# Patient Record
Sex: FEMALE | Race: WHITE | NOT HISPANIC OR LATINO | Employment: OTHER | URBAN - METROPOLITAN AREA
[De-identification: names, ages, dates, MRNs, and addresses within clinical notes are randomized per-mention and may not be internally consistent; named-entity substitution may affect disease eponyms.]

---

## 2020-09-28 ENCOUNTER — HOSPITAL ENCOUNTER (OUTPATIENT)
Dept: LAB | Facility: MEDICAL CENTER | Age: 70
End: 2020-09-28
Attending: PSYCHIATRY & NEUROLOGY
Payer: MEDICARE

## 2020-09-28 LAB
ALBUMIN SERPL BCP-MCNC: 4.6 G/DL (ref 3.2–4.9)
ALBUMIN/GLOB SERPL: 1.6 G/DL
ALP SERPL-CCNC: 33 U/L (ref 30–99)
ALT SERPL-CCNC: 16 U/L (ref 2–50)
ANION GAP SERPL CALC-SCNC: 15 MMOL/L (ref 7–16)
AST SERPL-CCNC: 27 U/L (ref 12–45)
BASOPHILS # BLD AUTO: 0.5 % (ref 0–1.8)
BASOPHILS # BLD: 0.04 K/UL (ref 0–0.12)
BILIRUB SERPL-MCNC: 0.3 MG/DL (ref 0.1–1.5)
BUN SERPL-MCNC: 25 MG/DL (ref 8–22)
CALCIUM SERPL-MCNC: 10.1 MG/DL (ref 8.5–10.5)
CHLORIDE SERPL-SCNC: 100 MMOL/L (ref 96–112)
CO2 SERPL-SCNC: 25 MMOL/L (ref 20–33)
CREAT SERPL-MCNC: 0.74 MG/DL (ref 0.5–1.4)
CRP SERPL HS-MCNC: 0.08 MG/DL (ref 0–0.75)
EOSINOPHIL # BLD AUTO: 0.06 K/UL (ref 0–0.51)
EOSINOPHIL NFR BLD: 0.7 % (ref 0–6.9)
ERYTHROCYTE [DISTWIDTH] IN BLOOD BY AUTOMATED COUNT: 50.8 FL (ref 35.9–50)
FOLATE SERPL-MCNC: 33.9 NG/ML
GLOBULIN SER CALC-MCNC: 2.8 G/DL (ref 1.9–3.5)
GLUCOSE SERPL-MCNC: 91 MG/DL (ref 65–99)
HCT VFR BLD AUTO: 46.5 % (ref 37–47)
HGB BLD-MCNC: 15.2 G/DL (ref 12–16)
IMM GRANULOCYTES # BLD AUTO: 0.02 K/UL (ref 0–0.11)
IMM GRANULOCYTES NFR BLD AUTO: 0.2 % (ref 0–0.9)
LYMPHOCYTES # BLD AUTO: 2.54 K/UL (ref 1–4.8)
LYMPHOCYTES NFR BLD: 31.4 % (ref 22–41)
MCH RBC QN AUTO: 30.7 PG (ref 27–33)
MCHC RBC AUTO-ENTMCNC: 32.7 G/DL (ref 33.6–35)
MCV RBC AUTO: 93.9 FL (ref 81.4–97.8)
MONOCYTES # BLD AUTO: 0.67 K/UL (ref 0–0.85)
MONOCYTES NFR BLD AUTO: 8.3 % (ref 0–13.4)
NEUTROPHILS # BLD AUTO: 4.76 K/UL (ref 2–7.15)
NEUTROPHILS NFR BLD: 58.9 % (ref 44–72)
NRBC # BLD AUTO: 0 K/UL
NRBC BLD-RTO: 0 /100 WBC
PLATELET # BLD AUTO: 268 K/UL (ref 164–446)
PMV BLD AUTO: 10.1 FL (ref 9–12.9)
POTASSIUM SERPL-SCNC: 4.5 MMOL/L (ref 3.6–5.5)
PROT SERPL-MCNC: 7.4 G/DL (ref 6–8.2)
RBC # BLD AUTO: 4.95 M/UL (ref 4.2–5.4)
SODIUM SERPL-SCNC: 140 MMOL/L (ref 135–145)
TREPONEMA PALLIDUM IGG+IGM AB [PRESENCE] IN SERUM OR PLASMA BY IMMUNOASSAY: NORMAL
TSH SERPL DL<=0.005 MIU/L-ACNC: 1.19 UIU/ML (ref 0.38–5.33)
VIT B12 SERPL-MCNC: 2689 PG/ML (ref 211–911)
WBC # BLD AUTO: 8.1 K/UL (ref 4.8–10.8)

## 2020-09-28 PROCEDURE — 85652 RBC SED RATE AUTOMATED: CPT

## 2020-09-28 PROCEDURE — 84445 ASSAY OF TSI GLOBULIN: CPT

## 2020-09-28 PROCEDURE — 86480 TB TEST CELL IMMUN MEASURE: CPT

## 2020-09-28 PROCEDURE — 82787 IGG 1 2 3 OR 4 EACH: CPT

## 2020-09-28 PROCEDURE — 84443 ASSAY THYROID STIM HORMONE: CPT | Mod: GA

## 2020-09-28 PROCEDURE — 85549 MURAMIDASE: CPT

## 2020-09-28 PROCEDURE — 85520 HEPARIN ASSAY: CPT

## 2020-09-28 PROCEDURE — 85730 THROMBOPLASTIN TIME PARTIAL: CPT | Mod: GA

## 2020-09-28 PROCEDURE — 86780 TREPONEMA PALLIDUM: CPT | Mod: GA

## 2020-09-28 PROCEDURE — 36415 COLL VENOUS BLD VENIPUNCTURE: CPT

## 2020-09-28 PROCEDURE — 82164 ANGIOTENSIN I ENZYME TEST: CPT

## 2020-09-28 PROCEDURE — 85613 RUSSELL VIPER VENOM DILUTED: CPT

## 2020-09-28 PROCEDURE — 84439 ASSAY OF FREE THYROXINE: CPT | Mod: GA

## 2020-09-28 PROCEDURE — 81241 F5 GENE: CPT

## 2020-09-28 PROCEDURE — 86140 C-REACTIVE PROTEIN: CPT

## 2020-09-28 PROCEDURE — 82746 ASSAY OF FOLIC ACID SERUM: CPT

## 2020-09-28 PROCEDURE — 86147 CARDIOLIPIN ANTIBODY EA IG: CPT

## 2020-09-28 PROCEDURE — 80053 COMPREHEN METABOLIC PANEL: CPT

## 2020-09-28 PROCEDURE — 85025 COMPLETE CBC W/AUTO DIFF WBC: CPT

## 2020-09-28 PROCEDURE — 86038 ANTINUCLEAR ANTIBODIES: CPT

## 2020-09-28 PROCEDURE — 86235 NUCLEAR ANTIGEN ANTIBODY: CPT

## 2020-09-28 PROCEDURE — 82607 VITAMIN B-12: CPT

## 2020-09-28 PROCEDURE — 85610 PROTHROMBIN TIME: CPT | Mod: GA

## 2020-09-29 LAB
APTT PPP: 27.2 SEC (ref 24.7–36)
ERYTHROCYTE [SEDIMENTATION RATE] IN BLOOD BY WESTERGREN METHOD: 4 MM/HOUR (ref 0–30)
INR PPP: 0.93 (ref 0.87–1.13)
LA PPP-IMP: NORMAL
PROTHROMBIN TIME: 12.7 SEC (ref 12–14.6)
SCREEN DRVVT: 36.3 SEC (ref 28–48)
UFH PPP CHRO-ACNC: <0.1 U/ML

## 2020-09-30 LAB
ACE SERPL-CCNC: 21 U/L (ref 9–67)
CARDIOLIPIN IGA SER IA-ACNC: 1 APL (ref 0–11)
CARDIOLIPIN IGG SER IA-ACNC: 4 GPL (ref 0–14)
CARDIOLIPIN IGM SER IA-ACNC: 5 MPL (ref 0–12)
GAMMA INTERFERON BACKGROUND BLD IA-ACNC: 0.02 IU/ML
IGG4 SER-MCNC: 27 MG/DL (ref 1–123)
M TB IFN-G BLD-IMP: NEGATIVE
M TB IFN-G CD4+ BCKGRND COR BLD-ACNC: 0 IU/ML
MITOGEN IGNF BCKGRD COR BLD-ACNC: >10 IU/ML
NUCLEAR IGG SER QL IA: NORMAL
QFT TB2 - NIL TBQ2: 0 IU/ML
SSA52 R0ENA AB IGG Q0420: 1 AU/ML (ref 0–40)
SSA60 R0ENA AB IGG Q0419: 41 AU/ML (ref 0–40)

## 2020-10-01 ENCOUNTER — HOSPITAL ENCOUNTER (OUTPATIENT)
Dept: RADIOLOGY | Facility: MEDICAL CENTER | Age: 70
End: 2020-10-01
Attending: PSYCHIATRY & NEUROLOGY
Payer: MEDICARE

## 2020-10-01 DIAGNOSIS — H57.12 ORBITAL PAIN, LEFT: ICD-10-CM

## 2020-10-01 DIAGNOSIS — H49.20 ABDUCENS NERVE PALSY, UNSPECIFIED LATERALITY: ICD-10-CM

## 2020-10-01 DIAGNOSIS — H57.12 PAIN IN LEFT EYE: ICD-10-CM

## 2020-10-01 DIAGNOSIS — H05.122 ORBITAL MYOSITIS OF LEFT SIDE: ICD-10-CM

## 2020-10-01 LAB — TSI SER-ACNC: <0.1 IU/L

## 2020-10-01 PROCEDURE — 70543 MRI ORBT/FAC/NCK W/O &W/DYE: CPT

## 2020-10-01 PROCEDURE — A9576 INJ PROHANCE MULTIPACK: HCPCS | Performed by: PSYCHIATRY & NEUROLOGY

## 2020-10-01 PROCEDURE — 70553 MRI BRAIN STEM W/O & W/DYE: CPT

## 2020-10-01 PROCEDURE — 700117 HCHG RX CONTRAST REV CODE 255: Performed by: PSYCHIATRY & NEUROLOGY

## 2020-10-01 RX ADMIN — GADOTERIDOL 20 ML: 279.3 INJECTION, SOLUTION INTRAVENOUS at 19:04

## 2020-10-03 LAB — T4 FREE SERPL DIALY-MCNC: 1.9 NG/DL (ref 1.1–2.4)

## 2020-10-04 LAB
F5 P.R506Q BLD/T QL: NEGATIVE
LYSOZYME SERPL-MCNC: 0.95 UG/ML

## 2020-11-10 ENCOUNTER — HOSPITAL ENCOUNTER (OUTPATIENT)
Dept: RADIOLOGY | Facility: MEDICAL CENTER | Age: 70
End: 2020-11-10
Attending: PSYCHIATRY & NEUROLOGY
Payer: MEDICARE

## 2020-11-10 DIAGNOSIS — I77.1 STRICTURE OF ARTERY (HCC): ICD-10-CM

## 2020-11-10 PROCEDURE — 70544 MR ANGIOGRAPHY HEAD W/O DYE: CPT

## 2020-11-10 PROCEDURE — A9576 INJ PROHANCE MULTIPACK: HCPCS | Performed by: PSYCHIATRY & NEUROLOGY

## 2020-11-10 PROCEDURE — 700117 HCHG RX CONTRAST REV CODE 255: Performed by: PSYCHIATRY & NEUROLOGY

## 2020-11-10 PROCEDURE — 70549 MR ANGIOGRAPH NECK W/O&W/DYE: CPT

## 2020-11-10 RX ADMIN — GADOTERIDOL 20 ML: 279.3 INJECTION, SOLUTION INTRAVENOUS at 15:53

## 2021-01-26 ENCOUNTER — APPOINTMENT (OUTPATIENT)
Dept: RADIOLOGY | Facility: MEDICAL CENTER | Age: 71
DRG: 093 | End: 2021-01-26
Attending: STUDENT IN AN ORGANIZED HEALTH CARE EDUCATION/TRAINING PROGRAM
Payer: MEDICARE

## 2021-01-26 ENCOUNTER — HOSPITAL ENCOUNTER (INPATIENT)
Facility: MEDICAL CENTER | Age: 71
LOS: 2 days | DRG: 093 | End: 2021-01-28
Attending: EMERGENCY MEDICINE | Admitting: INTERNAL MEDICINE
Payer: MEDICARE

## 2021-01-26 PROBLEM — I67.1 CEREBROVASCULAR DURAL AV FISTULA: Status: ACTIVE | Noted: 2021-01-26

## 2021-01-26 PROBLEM — E11.9 TYPE 2 DIABETES MELLITUS (HCC): Status: ACTIVE | Noted: 2021-01-26

## 2021-01-26 PROBLEM — I10 HYPERTENSION: Status: ACTIVE | Noted: 2021-01-26

## 2021-01-26 PROBLEM — E78.5 HYPERLIPIDEMIA: Status: ACTIVE | Noted: 2021-01-26

## 2021-01-26 PROBLEM — J45.909 ASTHMA: Status: ACTIVE | Noted: 2021-01-26

## 2021-01-26 LAB
ALBUMIN SERPL BCP-MCNC: 4.3 G/DL (ref 3.2–4.9)
ALBUMIN/GLOB SERPL: 1.4 G/DL
ALP SERPL-CCNC: 43 U/L (ref 30–99)
ALT SERPL-CCNC: 15 U/L (ref 2–50)
ANION GAP SERPL CALC-SCNC: 12 MMOL/L (ref 7–16)
AST SERPL-CCNC: 33 U/L (ref 12–45)
BASOPHILS # BLD AUTO: 1 % (ref 0–1.8)
BASOPHILS # BLD: 0.09 K/UL (ref 0–0.12)
BILIRUB SERPL-MCNC: 0.3 MG/DL (ref 0.1–1.5)
BUN SERPL-MCNC: 15 MG/DL (ref 8–22)
CALCIUM SERPL-MCNC: 10.6 MG/DL (ref 8.5–10.5)
CHLORIDE SERPL-SCNC: 102 MMOL/L (ref 96–112)
CO2 SERPL-SCNC: 26 MMOL/L (ref 20–33)
CREAT SERPL-MCNC: 0.85 MG/DL (ref 0.5–1.4)
CRP SERPL HS-MCNC: 0.34 MG/DL (ref 0–0.75)
D DIMER PPP IA.FEU-MCNC: 1.64 UG/ML (FEU) (ref 0–0.5)
EOSINOPHIL # BLD AUTO: 0.13 K/UL (ref 0–0.51)
EOSINOPHIL NFR BLD: 1.4 % (ref 0–6.9)
ERYTHROCYTE [DISTWIDTH] IN BLOOD BY AUTOMATED COUNT: 46.7 FL (ref 35.9–50)
ERYTHROCYTE [SEDIMENTATION RATE] IN BLOOD BY WESTERGREN METHOD: 11 MM/HOUR (ref 0–30)
GLOBULIN SER CALC-MCNC: 3.1 G/DL (ref 1.9–3.5)
GLUCOSE BLD-MCNC: 82 MG/DL (ref 65–99)
GLUCOSE SERPL-MCNC: 118 MG/DL (ref 65–99)
HCT VFR BLD AUTO: 46.7 % (ref 37–47)
HGB BLD-MCNC: 15.4 G/DL (ref 12–16)
IMM GRANULOCYTES # BLD AUTO: 0.03 K/UL (ref 0–0.11)
IMM GRANULOCYTES NFR BLD AUTO: 0.3 % (ref 0–0.9)
LYMPHOCYTES # BLD AUTO: 2.38 K/UL (ref 1–4.8)
LYMPHOCYTES NFR BLD: 25.5 % (ref 22–41)
MCH RBC QN AUTO: 32.2 PG (ref 27–33)
MCHC RBC AUTO-ENTMCNC: 33 G/DL (ref 33.6–35)
MCV RBC AUTO: 97.5 FL (ref 81.4–97.8)
MONOCYTES # BLD AUTO: 0.74 K/UL (ref 0–0.85)
MONOCYTES NFR BLD AUTO: 7.9 % (ref 0–13.4)
NEUTROPHILS # BLD AUTO: 5.97 K/UL (ref 2–7.15)
NEUTROPHILS NFR BLD: 63.9 % (ref 44–72)
NRBC # BLD AUTO: 0 K/UL
NRBC BLD-RTO: 0 /100 WBC
PLATELET # BLD AUTO: 218 K/UL (ref 164–446)
PMV BLD AUTO: 10.7 FL (ref 9–12.9)
POTASSIUM SERPL-SCNC: 4.5 MMOL/L (ref 3.6–5.5)
PROT SERPL-MCNC: 7.4 G/DL (ref 6–8.2)
RBC # BLD AUTO: 4.79 M/UL (ref 4.2–5.4)
SODIUM SERPL-SCNC: 140 MMOL/L (ref 135–145)
T4 FREE SERPL-MCNC: 0.67 NG/DL (ref 0.93–1.7)
TSH SERPL DL<=0.005 MIU/L-ACNC: 1.71 UIU/ML (ref 0.38–5.33)
WBC # BLD AUTO: 9.3 K/UL (ref 4.8–10.8)

## 2021-01-26 PROCEDURE — 84443 ASSAY THYROID STIM HORMONE: CPT

## 2021-01-26 PROCEDURE — 70544 MR ANGIOGRAPHY HEAD W/O DYE: CPT

## 2021-01-26 PROCEDURE — 85025 COMPLETE CBC W/AUTO DIFF WBC: CPT

## 2021-01-26 PROCEDURE — A9270 NON-COVERED ITEM OR SERVICE: HCPCS | Performed by: STUDENT IN AN ORGANIZED HEALTH CARE EDUCATION/TRAINING PROGRAM

## 2021-01-26 PROCEDURE — 80053 COMPREHEN METABOLIC PANEL: CPT

## 2021-01-26 PROCEDURE — 99223 1ST HOSP IP/OBS HIGH 75: CPT | Mod: AI,GC | Performed by: INTERNAL MEDICINE

## 2021-01-26 PROCEDURE — 70553 MRI BRAIN STEM W/O & W/DYE: CPT

## 2021-01-26 PROCEDURE — 85379 FIBRIN DEGRADATION QUANT: CPT

## 2021-01-26 PROCEDURE — 86038 ANTINUCLEAR ANTIBODIES: CPT

## 2021-01-26 PROCEDURE — 70549 MR ANGIOGRAPH NECK W/O&W/DYE: CPT

## 2021-01-26 PROCEDURE — 700102 HCHG RX REV CODE 250 W/ 637 OVERRIDE(OP): Performed by: STUDENT IN AN ORGANIZED HEALTH CARE EDUCATION/TRAINING PROGRAM

## 2021-01-26 PROCEDURE — 770006 HCHG ROOM/CARE - MED/SURG/GYN SEMI*

## 2021-01-26 PROCEDURE — 82962 GLUCOSE BLOOD TEST: CPT

## 2021-01-26 PROCEDURE — 700117 HCHG RX CONTRAST REV CODE 255: Performed by: STUDENT IN AN ORGANIZED HEALTH CARE EDUCATION/TRAINING PROGRAM

## 2021-01-26 PROCEDURE — 86140 C-REACTIVE PROTEIN: CPT

## 2021-01-26 PROCEDURE — 85652 RBC SED RATE AUTOMATED: CPT

## 2021-01-26 PROCEDURE — U0005 INFEC AGEN DETEC AMPLI PROBE: HCPCS

## 2021-01-26 PROCEDURE — 700111 HCHG RX REV CODE 636 W/ 250 OVERRIDE (IP): Performed by: STUDENT IN AN ORGANIZED HEALTH CARE EDUCATION/TRAINING PROGRAM

## 2021-01-26 PROCEDURE — 99285 EMERGENCY DEPT VISIT HI MDM: CPT

## 2021-01-26 PROCEDURE — 84439 ASSAY OF FREE THYROXINE: CPT

## 2021-01-26 PROCEDURE — U0003 INFECTIOUS AGENT DETECTION BY NUCLEIC ACID (DNA OR RNA); SEVERE ACUTE RESPIRATORY SYNDROME CORONAVIRUS 2 (SARS-COV-2) (CORONAVIRUS DISEASE [COVID-19]), AMPLIFIED PROBE TECHNIQUE, MAKING USE OF HIGH THROUGHPUT TECHNOLOGIES AS DESCRIBED BY CMS-2020-01-R: HCPCS

## 2021-01-26 PROCEDURE — A9576 INJ PROHANCE MULTIPACK: HCPCS | Performed by: STUDENT IN AN ORGANIZED HEALTH CARE EDUCATION/TRAINING PROGRAM

## 2021-01-26 RX ORDER — VITAMIN B COMPLEX
1000 TABLET ORAL 2 TIMES DAILY
COMMUNITY

## 2021-01-26 RX ORDER — M-VIT,TX,IRON,MINS/CALC/FOLIC 27MG-0.4MG
1 TABLET ORAL DAILY
Status: DISCONTINUED | OUTPATIENT
Start: 2021-01-27 | End: 2021-01-28 | Stop reason: HOSPADM

## 2021-01-26 RX ORDER — ATORVASTATIN CALCIUM 10 MG/1
10 TABLET, FILM COATED ORAL EVERY EVENING
COMMUNITY

## 2021-01-26 RX ORDER — ACETAMINOPHEN 325 MG/1
650 TABLET ORAL EVERY 6 HOURS PRN
Status: DISCONTINUED | OUTPATIENT
Start: 2021-01-26 | End: 2021-01-28 | Stop reason: HOSPADM

## 2021-01-26 RX ORDER — M-VIT,TX,IRON,MINS/CALC/FOLIC 27MG-0.4MG
0.5 TABLET ORAL 2 TIMES DAILY
COMMUNITY

## 2021-01-26 RX ORDER — POTASSIUM CHLORIDE 750 MG/1
10 TABLET, EXTENDED RELEASE ORAL 2 TIMES DAILY
Status: ON HOLD | COMMUNITY
End: 2021-01-28

## 2021-01-26 RX ORDER — CHOLECALCIFEROL (VITAMIN D3) 125 MCG
1000 CAPSULE ORAL DAILY
Status: DISCONTINUED | OUTPATIENT
Start: 2021-01-27 | End: 2021-01-28 | Stop reason: HOSPADM

## 2021-01-26 RX ORDER — LABETALOL HYDROCHLORIDE 5 MG/ML
10 INJECTION, SOLUTION INTRAVENOUS EVERY 4 HOURS PRN
Status: DISCONTINUED | OUTPATIENT
Start: 2021-01-26 | End: 2021-01-28 | Stop reason: HOSPADM

## 2021-01-26 RX ORDER — DEXTROSE MONOHYDRATE 25 G/50ML
50 INJECTION, SOLUTION INTRAVENOUS
Status: DISCONTINUED | OUTPATIENT
Start: 2021-01-26 | End: 2021-01-28 | Stop reason: HOSPADM

## 2021-01-26 RX ORDER — FUROSEMIDE 40 MG/1
40 TABLET ORAL DAILY
COMMUNITY

## 2021-01-26 RX ORDER — GAUZE BANDAGE 2" X 2"
100 BANDAGE TOPICAL DAILY
Status: DISCONTINUED | OUTPATIENT
Start: 2021-01-27 | End: 2021-01-28 | Stop reason: HOSPADM

## 2021-01-26 RX ORDER — VITAMIN B COMPLEX
1000 TABLET ORAL DAILY
Status: DISCONTINUED | OUTPATIENT
Start: 2021-01-27 | End: 2021-01-28 | Stop reason: HOSPADM

## 2021-01-26 RX ORDER — BISACODYL 10 MG
10 SUPPOSITORY, RECTAL RECTAL
Status: DISCONTINUED | OUTPATIENT
Start: 2021-01-26 | End: 2021-01-28 | Stop reason: HOSPADM

## 2021-01-26 RX ORDER — NAPROXEN SODIUM 220 MG
220 TABLET ORAL 2 TIMES DAILY PRN
Status: ON HOLD | COMMUNITY
End: 2021-01-28

## 2021-01-26 RX ORDER — LOSARTAN POTASSIUM 100 MG/1
100 TABLET ORAL DAILY
COMMUNITY

## 2021-01-26 RX ORDER — LOSARTAN POTASSIUM 50 MG/1
100 TABLET ORAL DAILY
Status: DISCONTINUED | OUTPATIENT
Start: 2021-01-27 | End: 2021-01-27

## 2021-01-26 RX ORDER — BIOTIN 5 MG
1000 TABLET ORAL DAILY
Status: DISCONTINUED | OUTPATIENT
Start: 2021-01-27 | End: 2021-01-26

## 2021-01-26 RX ORDER — INSULIN GLARGINE 300 U/ML
36 INJECTION, SOLUTION SUBCUTANEOUS
COMMUNITY

## 2021-01-26 RX ORDER — BIOTIN 5 MG
1000 TABLET ORAL DAILY
Status: ON HOLD | COMMUNITY
End: 2021-01-28

## 2021-01-26 RX ORDER — AMOXICILLIN 250 MG
2 CAPSULE ORAL 2 TIMES DAILY
Status: DISCONTINUED | OUTPATIENT
Start: 2021-01-26 | End: 2021-01-28 | Stop reason: HOSPADM

## 2021-01-26 RX ORDER — POLYETHYLENE GLYCOL 3350 17 G/17G
1 POWDER, FOR SOLUTION ORAL
Status: DISCONTINUED | OUTPATIENT
Start: 2021-01-26 | End: 2021-01-28 | Stop reason: HOSPADM

## 2021-01-26 RX ORDER — MONTELUKAST SODIUM 10 MG/1
10 TABLET ORAL
COMMUNITY

## 2021-01-26 RX ORDER — LANOLIN ALCOHOL/MO/W.PET/CERES
100 CREAM (GRAM) TOPICAL DAILY
COMMUNITY
End: 2021-09-13

## 2021-01-26 RX ORDER — MAGNESIUM OXIDE 400 MG/1
400 TABLET ORAL DAILY
Status: ON HOLD | COMMUNITY
End: 2021-01-28

## 2021-01-26 RX ORDER — ATORVASTATIN CALCIUM 10 MG/1
10 TABLET, FILM COATED ORAL EVERY EVENING
Status: DISCONTINUED | OUTPATIENT
Start: 2021-01-26 | End: 2021-01-28 | Stop reason: HOSPADM

## 2021-01-26 RX ORDER — MONTELUKAST SODIUM 10 MG/1
10 TABLET ORAL
Status: DISCONTINUED | OUTPATIENT
Start: 2021-01-26 | End: 2021-01-28 | Stop reason: HOSPADM

## 2021-01-26 RX ORDER — HEPARIN SODIUM 5000 [USP'U]/ML
5000 INJECTION, SOLUTION INTRAVENOUS; SUBCUTANEOUS EVERY 8 HOURS
Status: DISCONTINUED | OUTPATIENT
Start: 2021-01-26 | End: 2021-01-28 | Stop reason: HOSPADM

## 2021-01-26 RX ADMIN — HEPARIN SODIUM 5000 UNITS: 5000 INJECTION, SOLUTION INTRAVENOUS; SUBCUTANEOUS at 22:42

## 2021-01-26 RX ADMIN — ATORVASTATIN CALCIUM 10 MG: 10 TABLET, FILM COATED ORAL at 20:52

## 2021-01-26 RX ADMIN — MONTELUKAST 10 MG: 10 TABLET, FILM COATED ORAL at 22:42

## 2021-01-26 RX ADMIN — GADOTERIDOL 20 ML: 279.3 INJECTION, SOLUTION INTRAVENOUS at 22:02

## 2021-01-26 SDOH — HEALTH STABILITY: MENTAL HEALTH: HOW MANY STANDARD DRINKS CONTAINING ALCOHOL DO YOU HAVE ON A TYPICAL DAY?: 1 OR 2

## 2021-01-26 SDOH — HEALTH STABILITY: MENTAL HEALTH: HOW OFTEN DO YOU HAVE 6 OR MORE DRINKS ON ONE OCCASION?: NEVER

## 2021-01-26 SDOH — HEALTH STABILITY: MENTAL HEALTH: HOW OFTEN DO YOU HAVE A DRINK CONTAINING ALCOHOL?: MONTHLY OR LESS

## 2021-01-26 ASSESSMENT — LIFESTYLE VARIABLES
SUBSTANCE_ABUSE: 0
HAVE PEOPLE ANNOYED YOU BY CRITICIZING YOUR DRINKING: NO
TOTAL SCORE: 0
TOTAL SCORE: 0
EVER FELT BAD OR GUILTY ABOUT YOUR DRINKING: NO
EVER HAD A DRINK FIRST THING IN THE MORNING TO STEADY YOUR NERVES TO GET RID OF A HANGOVER: NO
DO YOU DRINK ALCOHOL: YES
TOTAL SCORE: 0
CONSUMPTION TOTAL: INCOMPLETE
HAVE YOU EVER FELT YOU SHOULD CUT DOWN ON YOUR DRINKING: NO

## 2021-01-26 ASSESSMENT — ENCOUNTER SYMPTOMS
SENSORY CHANGE: 0
FOCAL WEAKNESS: 0
ABDOMINAL PAIN: 0
NECK PAIN: 0
NAUSEA: 0
EYE PAIN: 1
VOMITING: 0
SHORTNESS OF BREATH: 0
CHILLS: 0
COUGH: 0
SORE THROAT: 0
WEAKNESS: 0
CONSTIPATION: 0
SPEECH CHANGE: 0
SINUS PAIN: 0
MYALGIAS: 0
DEPRESSION: 0
PALPITATIONS: 0
DIARRHEA: 0
DIZZINESS: 0
DOUBLE VISION: 0
BLURRED VISION: 1
FEVER: 0
BACK PAIN: 0
HEADACHES: 0
TINGLING: 1
WHEEZING: 0
WEIGHT LOSS: 0
BRUISES/BLEEDS EASILY: 0
PHOTOPHOBIA: 0
EYE DISCHARGE: 0

## 2021-01-26 ASSESSMENT — FIBROSIS 4 INDEX: FIB4 SCORE: 1.74

## 2021-01-26 ASSESSMENT — VISUAL ACUITY: OU: 1

## 2021-01-26 NOTE — ED TRIAGE NOTES
"Darnell Kirkland  70 y.o. female  Chief Complaint   Patient presents with   • Sent by MD     Patient was sent by her eye specialist recommending further tests including MRI of brain. Patient complaining of bilateral eye pressure and \"whooshing\" sound to bilateral ears that started 3 weeks ago.   "

## 2021-01-27 ENCOUNTER — APPOINTMENT (OUTPATIENT)
Dept: RADIOLOGY | Facility: MEDICAL CENTER | Age: 71
DRG: 093 | End: 2021-01-27
Attending: RADIOLOGY
Payer: MEDICARE

## 2021-01-27 LAB
ALBUMIN SERPL BCP-MCNC: 3.8 G/DL (ref 3.2–4.9)
ALBUMIN/GLOB SERPL: 1.6 G/DL
ALP SERPL-CCNC: 38 U/L (ref 30–99)
ALT SERPL-CCNC: 13 U/L (ref 2–50)
ANION GAP SERPL CALC-SCNC: 12 MMOL/L (ref 7–16)
AST SERPL-CCNC: 24 U/L (ref 12–45)
BASOPHILS # BLD AUTO: 0.9 % (ref 0–1.8)
BASOPHILS # BLD: 0.07 K/UL (ref 0–0.12)
BILIRUB SERPL-MCNC: 0.3 MG/DL (ref 0.1–1.5)
BUN SERPL-MCNC: 18 MG/DL (ref 8–22)
CALCIUM SERPL-MCNC: 9.4 MG/DL (ref 8.5–10.5)
CHLORIDE SERPL-SCNC: 101 MMOL/L (ref 96–112)
CO2 SERPL-SCNC: 26 MMOL/L (ref 20–33)
CREAT SERPL-MCNC: 0.79 MG/DL (ref 0.5–1.4)
EOSINOPHIL # BLD AUTO: 0.18 K/UL (ref 0–0.51)
EOSINOPHIL NFR BLD: 2.2 % (ref 0–6.9)
ERYTHROCYTE [DISTWIDTH] IN BLOOD BY AUTOMATED COUNT: 46.2 FL (ref 35.9–50)
GLOBULIN SER CALC-MCNC: 2.4 G/DL (ref 1.9–3.5)
GLUCOSE BLD-MCNC: 141 MG/DL (ref 65–99)
GLUCOSE BLD-MCNC: 145 MG/DL (ref 65–99)
GLUCOSE BLD-MCNC: 165 MG/DL (ref 65–99)
GLUCOSE BLD-MCNC: 176 MG/DL (ref 65–99)
GLUCOSE SERPL-MCNC: 115 MG/DL (ref 65–99)
HCT VFR BLD AUTO: 43.6 % (ref 37–47)
HGB BLD-MCNC: 14.2 G/DL (ref 12–16)
IMM GRANULOCYTES # BLD AUTO: 0.02 K/UL (ref 0–0.11)
IMM GRANULOCYTES NFR BLD AUTO: 0.2 % (ref 0–0.9)
INR PPP: 0.94 (ref 0.87–1.13)
LYMPHOCYTES # BLD AUTO: 2.49 K/UL (ref 1–4.8)
LYMPHOCYTES NFR BLD: 30.6 % (ref 22–41)
MAGNESIUM SERPL-MCNC: 2 MG/DL (ref 1.5–2.5)
MCH RBC QN AUTO: 31.7 PG (ref 27–33)
MCHC RBC AUTO-ENTMCNC: 32.6 G/DL (ref 33.6–35)
MCV RBC AUTO: 97.3 FL (ref 81.4–97.8)
MONOCYTES # BLD AUTO: 0.89 K/UL (ref 0–0.85)
MONOCYTES NFR BLD AUTO: 10.9 % (ref 0–13.4)
NEUTROPHILS # BLD AUTO: 4.5 K/UL (ref 2–7.15)
NEUTROPHILS NFR BLD: 55.2 % (ref 44–72)
NRBC # BLD AUTO: 0 K/UL
NRBC BLD-RTO: 0 /100 WBC
PLATELET # BLD AUTO: 213 K/UL (ref 164–446)
PMV BLD AUTO: 10.9 FL (ref 9–12.9)
POTASSIUM SERPL-SCNC: 4.2 MMOL/L (ref 3.6–5.5)
PROT SERPL-MCNC: 6.2 G/DL (ref 6–8.2)
PROTHROMBIN TIME: 12.9 SEC (ref 12–14.6)
RBC # BLD AUTO: 4.48 M/UL (ref 4.2–5.4)
SARS-COV-2 RNA RESP QL NAA+PROBE: NOTDETECTED
SODIUM SERPL-SCNC: 139 MMOL/L (ref 135–145)
SPECIMEN SOURCE: NORMAL
WBC # BLD AUTO: 8.2 K/UL (ref 4.8–10.8)

## 2021-01-27 PROCEDURE — 82962 GLUCOSE BLOOD TEST: CPT | Mod: 91

## 2021-01-27 PROCEDURE — 99153 MOD SED SAME PHYS/QHP EA: CPT

## 2021-01-27 PROCEDURE — 770006 HCHG ROOM/CARE - MED/SURG/GYN SEMI*

## 2021-01-27 PROCEDURE — A9270 NON-COVERED ITEM OR SERVICE: HCPCS | Performed by: STUDENT IN AN ORGANIZED HEALTH CARE EDUCATION/TRAINING PROGRAM

## 2021-01-27 PROCEDURE — B31CZZZ FLUOROSCOPY OF BILATERAL EXTERNAL CAROTID ARTERIES: ICD-10-PCS | Performed by: RADIOLOGY

## 2021-01-27 PROCEDURE — 700111 HCHG RX REV CODE 636 W/ 250 OVERRIDE (IP): Performed by: STUDENT IN AN ORGANIZED HEALTH CARE EDUCATION/TRAINING PROGRAM

## 2021-01-27 PROCEDURE — B31FZZZ FLUOROSCOPY OF LEFT VERTEBRAL ARTERY: ICD-10-PCS | Performed by: RADIOLOGY

## 2021-01-27 PROCEDURE — 36415 COLL VENOUS BLD VENIPUNCTURE: CPT

## 2021-01-27 PROCEDURE — 85610 PROTHROMBIN TIME: CPT

## 2021-01-27 PROCEDURE — 85025 COMPLETE CBC W/AUTO DIFF WBC: CPT

## 2021-01-27 PROCEDURE — 80053 COMPREHEN METABOLIC PANEL: CPT

## 2021-01-27 PROCEDURE — 99232 SBSQ HOSP IP/OBS MODERATE 35: CPT | Performed by: STUDENT IN AN ORGANIZED HEALTH CARE EDUCATION/TRAINING PROGRAM

## 2021-01-27 PROCEDURE — 700117 HCHG RX CONTRAST REV CODE 255: Performed by: RADIOLOGY

## 2021-01-27 PROCEDURE — B318ZZZ FLUOROSCOPY OF BILATERAL INTERNAL CAROTID ARTERIES: ICD-10-PCS | Performed by: RADIOLOGY

## 2021-01-27 PROCEDURE — 700102 HCHG RX REV CODE 250 W/ 637 OVERRIDE(OP): Performed by: STUDENT IN AN ORGANIZED HEALTH CARE EDUCATION/TRAINING PROGRAM

## 2021-01-27 PROCEDURE — B314ZZZ FLUOROSCOPY OF LEFT COMMON CAROTID ARTERY: ICD-10-PCS | Performed by: RADIOLOGY

## 2021-01-27 PROCEDURE — 700111 HCHG RX REV CODE 636 W/ 250 OVERRIDE (IP): Performed by: RADIOLOGY

## 2021-01-27 PROCEDURE — 700111 HCHG RX REV CODE 636 W/ 250 OVERRIDE (IP)

## 2021-01-27 PROCEDURE — 83735 ASSAY OF MAGNESIUM: CPT

## 2021-01-27 RX ORDER — LOSARTAN POTASSIUM 50 MG/1
50 TABLET ORAL DAILY
Status: DISCONTINUED | OUTPATIENT
Start: 2021-01-28 | End: 2021-01-28 | Stop reason: HOSPADM

## 2021-01-27 RX ORDER — ONDANSETRON 2 MG/ML
4 INJECTION INTRAMUSCULAR; INTRAVENOUS PRN
Status: ACTIVE | OUTPATIENT
Start: 2021-01-27 | End: 2021-01-27

## 2021-01-27 RX ORDER — SODIUM CHLORIDE 9 MG/ML
500 INJECTION, SOLUTION INTRAVENOUS
Status: ACTIVE | OUTPATIENT
Start: 2021-01-27 | End: 2021-01-27

## 2021-01-27 RX ORDER — MIDAZOLAM HYDROCHLORIDE 1 MG/ML
.5-2 INJECTION INTRAMUSCULAR; INTRAVENOUS PRN
Status: ACTIVE | OUTPATIENT
Start: 2021-01-27 | End: 2021-01-27

## 2021-01-27 RX ORDER — MIDAZOLAM HYDROCHLORIDE 1 MG/ML
INJECTION INTRAMUSCULAR; INTRAVENOUS
Status: COMPLETED
Start: 2021-01-27 | End: 2021-01-27

## 2021-01-27 RX ADMIN — LOSARTAN POTASSIUM 100 MG: 50 TABLET, FILM COATED ORAL at 05:39

## 2021-01-27 RX ADMIN — IOHEXOL 80 ML: 300 INJECTION, SOLUTION INTRAVENOUS at 16:00

## 2021-01-27 RX ADMIN — ACETAMINOPHEN 650 MG: 325 TABLET, FILM COATED ORAL at 07:49

## 2021-01-27 RX ADMIN — ACETAMINOPHEN 650 MG: 325 TABLET, FILM COATED ORAL at 18:40

## 2021-01-27 RX ADMIN — Medication 100 MG: at 05:38

## 2021-01-27 RX ADMIN — HEPARIN SODIUM 5000 UNITS: 5000 INJECTION, SOLUTION INTRAVENOUS; SUBCUTANEOUS at 05:39

## 2021-01-27 RX ADMIN — MULTIPLE VITAMINS W/ MINERALS TAB 1 TABLET: TAB at 05:39

## 2021-01-27 RX ADMIN — MIDAZOLAM HYDROCHLORIDE 1 MG: 1 INJECTION INTRAMUSCULAR; INTRAVENOUS at 14:38

## 2021-01-27 RX ADMIN — Medication 1000 UNITS: at 05:39

## 2021-01-27 RX ADMIN — FENTANYL CITRATE 25 MCG: 50 INJECTION, SOLUTION INTRAMUSCULAR; INTRAVENOUS at 14:38

## 2021-01-27 RX ADMIN — INSULIN LISPRO 7 UNITS: 100 INJECTION, SOLUTION INTRAVENOUS; SUBCUTANEOUS at 18:39

## 2021-01-27 RX ADMIN — FENTANYL CITRATE 25 MCG: 50 INJECTION, SOLUTION INTRAMUSCULAR; INTRAVENOUS at 14:42

## 2021-01-27 RX ADMIN — INSULIN LISPRO 7 UNITS: 100 INJECTION, SOLUTION INTRAVENOUS; SUBCUTANEOUS at 09:10

## 2021-01-27 RX ADMIN — HEPARIN SODIUM 5000 UNITS: 5000 INJECTION, SOLUTION INTRAVENOUS; SUBCUTANEOUS at 21:32

## 2021-01-27 RX ADMIN — MONTELUKAST 10 MG: 10 TABLET, FILM COATED ORAL at 21:32

## 2021-01-27 RX ADMIN — CYANOCOBALAMIN TAB 500 MCG 1000 MCG: 500 TAB at 05:39

## 2021-01-27 RX ADMIN — ASPIRIN 81 MG: 81 TABLET, COATED ORAL at 05:39

## 2021-01-27 RX ADMIN — MIDAZOLAM HYDROCHLORIDE 1 MG: 1 INJECTION, SOLUTION INTRAMUSCULAR; INTRAVENOUS at 14:38

## 2021-01-27 RX ADMIN — ATORVASTATIN CALCIUM 10 MG: 10 TABLET, FILM COATED ORAL at 18:40

## 2021-01-27 ASSESSMENT — ENCOUNTER SYMPTOMS
HEADACHES: 0
COUGH: 0
SENSORY CHANGE: 0
EYE REDNESS: 1
DOUBLE VISION: 0
SPUTUM PRODUCTION: 0
DIZZINESS: 0
DEPRESSION: 0
PHOTOPHOBIA: 0
BLURRED VISION: 0
SHORTNESS OF BREATH: 0
FEVER: 0
EYE PAIN: 0
NAUSEA: 0
SPEECH CHANGE: 0
VOMITING: 0

## 2021-01-27 ASSESSMENT — PAIN DESCRIPTION - PAIN TYPE: TYPE: ACUTE PAIN

## 2021-01-27 NOTE — PROGRESS NOTES
IR RN note    Patient underwent a Cerebral Angiogram by Dr. Burnett.  Procedure site was verified by MD using imaging guidance. Consent was signed.  BRIAN Crowe and Gsu assisted. Patient was placed in a Supine position.  Vitals were taken every 5 minutes and remained stable during procedure (see doc flow sheet for results).  CO2 waveform capnography was monitored throughout procedure, see chart.  Right groin access site.   An Angioseal, gauze and tegaderm dressing was placed over surgical site. Report called to Rita CHAMPION. Pt transported by lore with RN to S182-2, with monitor .   Reviewed sedation orders with MD Askew 6 Fr  REF # [id number]  LOT # [id number]  Exp. 9/30/21

## 2021-01-27 NOTE — ASSESSMENT & PLAN NOTE
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

## 2021-01-27 NOTE — H&P
"History & Physical Note    Date of Admission: 1/26/2021  Admission Status: Emergency  [facility] Team: UNSOM  Attending: Yoel Jackson M.D.   Night Senior Resident: Dr. Brito  Admitting Intern: Dr. Blue  Contact Number: [phone]    Chief Complaint: scleral injection, watery eyes, concern for dural AV fistula    History of Present Illness (HPI):     Darnell Kirkland is a 70 y.o. female with past medical history of type 2 diabetes, migraine, asthma, hypertension, and hyperlipidemia who presented to the ED on 1/26/2021 with 1 month history of increased eye pressure, and hearing \"swooshing\" sound in right ear.  Patient was seen in clinic with her neuro-ophthalmologist Dr. Hutchins who sent her to the ED for concern of dural based AV fistula given symptoms of increased bilateral eye pressure, sclera injection with corkscrew venous dilation, and intermittent HA that was previously treated with steroids per patient. Patient states that she has had increased eye pressure over the last month to recently started in the left eye and eventually also began in the right eye; patient denies any pain or vision changes with this increased pressure.  However, patient endorses that the eye pressure is worse in the morning when she first wakes up, but eventually subsides throughout the day-notes that the pressure gets so bad and she can barely see.  Patient also endorses whooshing sound in the right ear, pulsatile in nature that has also occurred over the past month.  She denies any recent headaches, nausea, vomiting, but endorses feeling unstable on her feet but denies any dizziness or lightheadedness.  She denies any bleeding from the ears eyes or lungs denies abdominal pain denies any bowel or bladder dysfunction denies any neuropathy or muscle weakness of face.    ED course: Patient is afebrile, tachycardic to 110, respiratory rate 18, blood pressure hypertensive to 154/86, saturating well on room air.  WBC 9.3, sodium 140, " potassium 4.5, BUN 15, creatinine 0.85, LCM 10.6, TSH 1.71, free T4 0.67, CRP 0.34.  MRI brain with and without, MRA head and neck, and MR venogram ordered and pending.    Review of Systems:   Review of Systems   Constitutional: Negative for chills, fever, malaise/fatigue and weight loss.   HENT: Negative for ear discharge, ear pain, hearing loss, nosebleeds, sinus pain, sore throat and tinnitus.    Eyes: Positive for blurred vision and pain. Negative for double vision, photophobia and discharge.        Feels bilateral eye pressure - not pain; blurred vision in AM when pressure is at worst, denies HA   Respiratory: Negative for cough, shortness of breath and wheezing.    Cardiovascular: Negative for chest pain, palpitations and leg swelling.   Gastrointestinal: Negative for abdominal pain, constipation, diarrhea, nausea and vomiting.   Genitourinary: Negative for frequency, hematuria and urgency.   Musculoskeletal: Negative for back pain, joint pain, myalgias and neck pain.   Skin: Negative for itching and rash.   Neurological: Positive for tingling. Negative for dizziness, sensory change, speech change, focal weakness, weakness and headaches.        Diabetic neuropathy of b/l LE   Endo/Heme/Allergies: Does not bruise/bleed easily.   Psychiatric/Behavioral: Negative for depression, substance abuse and suicidal ideas.       Past Medical History:   Past Medical History was reviewed with patient.   has a past medical history of Asthma, Diabetes (HCC), Diabetic neuropathy (HCC), Head ache, Hyperlipidemia, Hypertension, and Migraine.    Past Surgical History: Past Surgical History was reviewed with patient.   has no past surgical history on file.    Medications: Medications have been reviewed with patient.    No current facility-administered medications on file prior to encounter.      Current Outpatient Medications on File Prior to Encounter   Medication Sig Dispense Refill   • Insulin Glargine, 2 Unit Dial, (TOUJEO MAX  SOLOSTAR) 300 UNIT/ML Solution Pen-injector Inject 36 Units under the skin every bedtime.     • insulin aspart (NOVOLOG) 100 UNIT/ML Solution Inject 2-10 Units under the skin 3 times a day before meals. 151-200 = 2 units  201-250 = 4 units  251-300 = 6 units  301-350 = 8 units  351-400 = 10 units     • metformin (GLUCOPHAGE) 1000 MG tablet Take 1,000 mg by mouth 2 times a day, with meals.     • potassium chloride SA (K-DUR) 10 MEQ Tab CR Take 10 mEq by mouth 2 times a day.     • furosemide (LASIX) 40 MG Tab Take 20 mg by mouth every day.     • atorvastatin (LIPITOR) 10 MG Tab Take 10 mg by mouth every day.     • losartan (COZAAR) 100 MG Tab Take 100 mg by mouth every day.     • montelukast (SINGULAIR) 10 MG Tab Take 10 mg by mouth every bedtime.     • cyanocobalamin (VITAMIN B12) 1000 MCG Tab Take 1,000 mcg by mouth every day.     • thiamine (THIAMINE) 100 MG tablet Take 100 mg by mouth every day.     • magnesium oxide (MAG-OX) 400 MG Tab tablet Take 400 mg by mouth every day.     • Krill Oil 1000 MG Cap Take 1,000 mg by mouth every day.     • vitamin D (CHOLECALCIFEROL) 1000 Unit (25 mcg) Tab Take 1,000 Units by mouth every day.     • therapeutic multivitamin-minerals (THERAGRAN-M) Tab Take 1 Tab by mouth every day.     • aspirin EC (ECOTRIN) 81 MG Tablet Delayed Response Take 81 mg by mouth every day.     • naproxen (ALEVE) 220 MG tablet Take 220 mg by mouth 2 times a day as needed. Indications: Pain          Allergies: Allergies have been reviewed with patient.  Allergies   Allergen Reactions   • Clindamycin      Hives       Family History:   family history is not on file.     Social History:   Tobacco: Quit 10 yrs ago, 40 PY history   Alcohol: infrequent, roughly 1/month   Recreational drugs (illegal and prescription):  THC gummies for Headache 2-6 months ago   Employment: retired  Living situation:  Lives in own home with  and daughter  Recent travel:  Denies  Primary Care Provider: tash MONTES  JAMES Alexis      Physical Exam:     Vitals:  Temp:  [36.6 °C (97.8 °F)] 36.6 °C (97.8 °F)  Pulse:  [110] 110  Resp:  [18] 18  BP: (154)/(86) 154/86  SpO2:  [93 %] 93 %    Physical Exam  Constitutional:       General: She is not in acute distress.     Appearance: Normal appearance. She is not ill-appearing or toxic-appearing.   HENT:      Head: Normocephalic and atraumatic.      Right Ear: External ear normal.      Left Ear: External ear normal.      Nose: Nose normal.   Eyes:      General: Lids are normal. Vision grossly intact. Gaze aligned appropriately. No scleral icterus.     Extraocular Movements: Extraocular movements intact.      Conjunctiva/sclera:      Right eye: Right conjunctiva is injected.      Left eye: Left conjunctiva is injected.      Pupils: Pupils are equal, round, and reactive to light.      Comments: Corkscrew venous dilation of sclera with diffuse injection of both eyes   Neck:      Musculoskeletal: Normal range of motion and neck supple.   Cardiovascular:      Rate and Rhythm: Regular rhythm. Tachycardia present.      Pulses: Normal pulses.      Heart sounds: Normal heart sounds. No murmur. No gallop.    Pulmonary:      Effort: Pulmonary effort is normal.      Breath sounds: Normal breath sounds. No wheezing, rhonchi or rales.   Abdominal:      General: Abdomen is flat. Bowel sounds are normal.      Palpations: Abdomen is soft.      Tenderness: There is no abdominal tenderness. There is no guarding or rebound.   Musculoskeletal: Normal range of motion.      Right lower leg: No edema.      Left lower leg: No edema.   Skin:     General: Skin is warm and dry.      Capillary Refill: Capillary refill takes less than 2 seconds.   Neurological:      General: No focal deficit present.      Mental Status: She is alert and oriented to person, place, and time. Mental status is at baseline.      Cranial Nerves: No cranial nerve deficit.      Sensory: No sensory deficit.      Motor: No weakness.       "Coordination: Coordination normal.   Psychiatric:         Mood and Affect: Mood normal.         Behavior: Behavior normal.         Thought Content: Thought content normal.         Judgment: Judgment normal.         Labs:   Recent Labs     01/26/21  1720   WBC 9.3   RBC 4.79   HEMOGLOBIN 15.4   HEMATOCRIT 46.7   MCV 97.5   MCH 32.2   RDW 46.7   PLATELETCT 218   MPV 10.7   NEUTSPOLYS 63.90   LYMPHOCYTES 25.50   MONOCYTES 7.90   EOSINOPHILS 1.40   BASOPHILS 1.00     Recent Labs     01/26/21  1720   SODIUM 140   POTASSIUM 4.5   CHLORIDE 102   CO2 26   GLUCOSE 118*   BUN 15     Recent Labs     01/26/21  1720   ALBUMIN 4.3   TBILIRUBIN 0.3   ALKPHOSPHAT 43   TOTPROTEIN 7.4   ALTSGPT 15   ASTSGOT 33   CREATININE 0.85         Imaging:   MR-BRAIN-WITH & W/O    (Results Pending)   MR-MRA NECK-WITH    (Results Pending)   MR-VENOGRAM (MRV) HEAD    (Results Pending)   MR-MRA HEAD-W/O    (Results Pending)         Previous Data Review: reviewed    Problem Representation:    Darnell Kirkland is a 70 y.o. female with past medical history of type 2 diabetes, migraine, asthma, hypertension, and hyperlipidemia who presented to the ED on 1/26/2021 with 1 month history of increased eye pressure, and hearing \"swooshing\" sound in right ear.  Patient was seen in clinic with her neuro-ophthalmologist Dr. Hutchins who sent her to the ED for concern of dural based AV fistula given symptoms of increased bilateral eye pressure, sclera injection with corkscrew venous dilation, and intermittent HA that was previously treated with steroids per patient. MRI brain with and without, MRA head and neck, and MR venogram ordered and pending.  Every 4 hours neuro check, will continue to monitor.  Consider consult to neurology today and/or neurosurgery.    Cerebrovascular dural AV fistula  Assessment & Plan  Patient sent to ED by Dr. Hutchins for concern of cerebrovascular dural AV fistula because of patient's symptoms of bilateral increased eye pressure, " headaches, and scleral injection with corkscrew venous dilatation bilaterally.  Will obtain MRI imaging, along with basic labs.  We will continue to monitor, and consider consult to neurology and/or neurosurgery.  -Admit to neuro floor  -Every 4 hours neuro checks  -MRI brain with and without contrast ordered  -MR angiogram ordered  -MR venogram ordered  -Pending MRI imaging consider consult to neurology and/or neurosurgery  -ESR, TSH, T4, MILA, ESR, CRP, D-dimer ordered  -Repeat CBC and CMP in a.m.    Type 2 diabetes mellitus (HCC)  Assessment & Plan  Patient has history of type 2 diabetes, last A1c unknown, but is managed on Metformin 1000 mg twice daily, Toujeo Solostar, and NovoLog insulin pen.  Considering patient is inpatient we will hold antihyperglycemics and modify insulin regimen.  -Hemoglobin A1c, B12, folate, vitamin D12 ordered  -Hypoglycemia protocol, SSI, diabetic diet  -Hold home antihyperglycemics  -Continue home vitamin B12, vitamin D3    Hyperlipidemia  Assessment & Plan  -Continue home atorvastatin    Hypertension  Assessment & Plan  Patient has history of hypertension with admission blood pressure being 154/86, however patient does not look acutely volume overloaded so we will hold home furosemide and continue losartan  -Hold home furosemide  -Continue home losartan  -As needed labetalol for SBP greater than 180, or DBP greater than 110    Asthma  Assessment & Plan  -Continue home montelukast

## 2021-01-27 NOTE — OR SURGEON
Immediate Post- Operative Note        PostOp Diagnosis: [facility]   Procedure(s): Diagnostic cerebral angiogram        Estimated Blood Loss: Less than 5 ml        Complications: None            1/27/2021     3:11 PM     Rojelio Burnett M.D.

## 2021-01-27 NOTE — PROGRESS NOTES
Assumed care of pt at 2030. Pt A/Ox4. Ambulated to bed from [facility]. No complaints of pain at this time. Pt's home medication (novolog pen) sent down to pharmacy. Treaded socks in place. Bed alarm on. Bed in lowest, locked position. Call light and belongings within reach. Frozen dinner provided. Hourly rounding in place.    -2RN skin check completed with Bianca RN:   -Mild edema to BLE. Blanchable redness to left ear. Otherwise, skin intact.

## 2021-01-27 NOTE — ED PROVIDER NOTES
"ED Provider Note        Primary care provider: Scout Alexis M.D.    I verified that the patient was wearing a mask and I was wearing appropriate PPE every time I entered the room. The patient's mask was on the patient at all times during my encounter except for a brief view of the oropharynx.      CHIEF COMPLAINT  Chief Complaint   Patient presents with   • Sent by MD BAEZ  Darnell Kirkland is a 70 y.o. female who presents to the Emergency Department with chief complaint of sent by neuro-ophthalmology specialist.  Patient reports that she has been having some abnormal vision difficulties over multiple months.  Patient recently had a dental block which sounds like a superior palatine block.  Shortly after that she is developing some worsening tearing eyes some severe scleral injection and she is also developed what she describes as a \"whooshing\" in her right ear over the last several days.  Patient saw her neuro-ophthalmologist today who was concerned that the patient had possible dural AV fistula and was sent here for further evaluation.  Patient reports that she has had increased tearing of her eyes but states that she feels as though her vision is okay today and her neuro-ophthalmologist said that her vision was preserved.  She has had minimal frontal headache she denies any neck pain she has had no fevers chills cough congestion chest pain shortness of breath abdominal pain no problems with urination or bowel movements no other acute symptoms or concerns at this time.    REVIEW OF SYSTEMS  10 systems reviewed and otherwise negative, pertinent positives and negatives listed in the history of present illness.    PAST MEDICAL HISTORY       SURGICAL HISTORY  patient denies any surgical history    SOCIAL HISTORY  Social History     Tobacco Use   • Smoking status: Former Smoker     Quit date: 1/20/2011     Years since quitting: 10.0   • Smokeless tobacco: Never Used   Substance Use Topics   • Alcohol use: Yes     " "Alcohol/week: 0.6 oz     Types: 1 Glasses of wine per week     Frequency: Monthly or less     Drinks per session: 1 or 2     Binge frequency: Never   • Drug use: Not Currently     Comment: previously took marijuana for headaches      Social History     Substance and Sexual Activity   Drug Use Not Currently    Comment: previously took marijuana for headaches       FAMILY HISTORY  Non-Contributory    CURRENT MEDICATIONS  Home Medications    **Home medications have not yet been reviewed for this encounter**         ALLERGIES  Allergies   Allergen Reactions   • Clindamycin      Hives       PHYSICAL EXAM  VITAL SIGNS: /86   Pulse (!) 110   Temp 36.6 °C (97.8 °F) (Oral)   Resp 18   Ht 1.702 m (5' 7\")   Wt 103.7 kg (228 lb 9.9 oz)   SpO2 93%   Breastfeeding No   BMI 35.81 kg/m²   Pulse ox interpretation: I interpret this pulse ox as normal.  Constitutional: Alert and oriented x 3, minimal distress  HEENT: Atraumatic normocephalic, pupils are equal round reactive to light extraocular movements are intact profound scleral injection bilaterally conjunctival erythema and copious tearing. The nares is clear, external ears are normal, mouth shows moist mucous membranes  Neck: Supple, no JVD no tracheal deviation  Cardiovascular: Tachycardic  no murmur rub or gallop 2+ pulses peripherally x4  Thorax & Lungs: No respiratory distress, no wheezes rales or rhonchi, No chest tenderness.   GI: Soft nontender nondistended positive bowel sounds, no peritoneal signs  Skin: Warm dry no acute rash or lesion  Musculoskeletal: Moving all extremities with full range and 5 of 5 strength, no acute  deformity  Neurologic: Cranial nerves III through XII are grossly intact, no sensory deficit, no cerebellar dysfunction   Psychiatric: Appropriate affect for situation at this time      DIAGNOSTIC STUDIES / PROCEDURES  LABS      Results for orders placed or performed during the hospital encounter of 01/26/21   CRP QUANTITIVE " (NON-CARDIAC)   Result Value Ref Range    Stat C-Reactive Protein 0.34 0.00 - 0.75 mg/dL   Sed Rate   Result Value Ref Range    Sed Rate Westergren 11 0 - 30 mm/hour   TSH   Result Value Ref Range    TSH 1.710 0.380 - 5.330 uIU/mL   CBC WITH DIFFERENTIAL   Result Value Ref Range    WBC 9.3 4.8 - 10.8 K/uL    RBC 4.79 4.20 - 5.40 M/uL    Hemoglobin 15.4 12.0 - 16.0 g/dL    Hematocrit 46.7 37.0 - 47.0 %    MCV 97.5 81.4 - 97.8 fL    MCH 32.2 27.0 - 33.0 pg    MCHC 33.0 (L) 33.6 - 35.0 g/dL    RDW 46.7 35.9 - 50.0 fL    Platelet Count 218 164 - 446 K/uL    MPV 10.7 9.0 - 12.9 fL    Neutrophils-Polys 63.90 44.00 - 72.00 %    Lymphocytes 25.50 22.00 - 41.00 %    Monocytes 7.90 0.00 - 13.40 %    Eosinophils 1.40 0.00 - 6.90 %    Basophils 1.00 0.00 - 1.80 %    Immature Granulocytes 0.30 0.00 - 0.90 %    Nucleated RBC 0.00 /100 WBC    Neutrophils (Absolute) 5.97 2.00 - 7.15 K/uL    Lymphs (Absolute) 2.38 1.00 - 4.80 K/uL    Monos (Absolute) 0.74 0.00 - 0.85 K/uL    Eos (Absolute) 0.13 0.00 - 0.51 K/uL    Baso (Absolute) 0.09 0.00 - 0.12 K/uL    Immature Granulocytes (abs) 0.03 0.00 - 0.11 K/uL    NRBC (Absolute) 0.00 K/uL   SARS-CoV-2 PCR (24 hour In-House): Collect NP swab in [facility]    Specimen: Respirate   Result Value Ref Range    SARS-CoV-2 Source NP Swab    FREE THYROXINE   Result Value Ref Range    Free T-4 0.67 (L) 0.93 - 1.70 ng/dL   D-DIMER   Result Value Ref Range    D-Dimer Screen 1.64 (H) 0.00 - 0.50 ug/mL (FEU)   Comp Metabolic Panel   Result Value Ref Range    Sodium 140 135 - 145 mmol/L    Potassium 4.5 3.6 - 5.5 mmol/L    Chloride 102 96 - 112 mmol/L    Co2 26 20 - 33 mmol/L    Anion Gap 12.0 7.0 - 16.0    Glucose 118 (H) 65 - 99 mg/dL    Bun 15 8 - 22 mg/dL    Creatinine 0.85 0.50 - 1.40 mg/dL    Calcium 10.6 (H) 8.5 - 10.5 mg/dL    AST(SGOT) 33 12 - 45 U/L    ALT(SGPT) 15 2 - 50 U/L    Alkaline Phosphatase 43 30 - 99 U/L    Total Bilirubin 0.3 0.1 - 1.5 mg/dL    Albumin 4.3 3.2 - 4.9 g/dL    Total Protein  "7.4 6.0 - 8.2 g/dL    Globulin 3.1 1.9 - 3.5 g/dL    A-G Ratio 1.4 g/dL   ESTIMATED GFR   Result Value Ref Range    GFR If African American >60 >60 mL/min/1.73 m 2    GFR If Non African American >60 >60 mL/min/1.73 m 2   ACCU-CHEK GLUCOSE   Result Value Ref Range    Glucose - Accu-Ck 82 65 - 99 mg/dL       All labs reviewed by me.        COURSE & MEDICAL DECISION MAKING  Pertinent Labs & Imaging studies reviewed. (See chart for details)    4:47 PM - Patient seen and examined at bedside.       Patient noted to have slightly elevated blood pressure likely circumstantial secondary to presenting complaint. Referred to primary care physician for further evaluation.      Medical Decision Making: Pleasant 70-year-old female who neuro-ophthalmology concerns with high possibility of dural AV fistula.  Her neuro-ophthalmologist Dr. Hutchins sent to lab and imaging slip with the patient multiple MRIs and labs.  Labs have been ordered at this time patient will likely require admission for MRIs and possible CT angiogram should these ambient rise be positive.  I discussed this with the [facility] internal medicine resident housestaff and the patient is admitted for ongoing evaluation and treatment admitted in guarded condition.    /62   Pulse 73   Temp 36.3 °C (97.3 °F) (Temporal)   Resp 15   Ht 1.702 m (5' 7\")   Wt 103.7 kg (228 lb 9.9 oz)   SpO2 94%   Breastfeeding No   BMI 35.81 kg/m²             FINAL IMPRESSION  1.  Bruit right ear  2.  Scleral injection  3.  Possible dural AV fistula by neuro-ophthalmology    This dictation has been created using voice recognition software and/or scribes. The accuracy of the dictation is limited by the abilities of the software and the expertise of the scribes. I expect there may be some errors of grammar and possibly content. I made every attempt to manually correct the errors within my dictation. However, errors related to voice recognition software and/or scribes may still exist " and should be interpreted within the appropriate context.

## 2021-01-27 NOTE — PROGRESS NOTES
[facility] Medicine Daily Progress Note    Date of Service  1/27/2021    Chief Complaint  70 y.o. female admitted 1/26/2021 for evaluation of dural AV fistula .    Hospital Course  70 female with past medical history of asthma, diabetes, diabetic neuropathy, hypertension, hyperlipidemia, migraine referred by neuro ophthalmologist for concern of dural AV fistula given bilateral increase in eye pressure associated with bilateral sectoral injection with chronic history of venous dilation.  Admitted under stroke unit for evaluation of cerebrovascular dural AV fistula.    Interval Problem Update  1/27/2021  MRV head, MRA neck, MRA head, MRI brain completed.  Discussed the case with neuro-ophthalmologist Dr. Hutchins  Patient is scheduled for cerebral angiogram today  Discussed with the patient's and patient's daughter about the MRI finding and plan for cerebral angiogram.    Consultants/Specialty  Neuro-ophthalmologist    Code Status  Full Code    Disposition  Home once stable    Review of Systems  Review of Systems   Constitutional: Negative for fever.   HENT: Positive for tinnitus.    Eyes: Positive for redness. Negative for blurred vision, double vision, photophobia and pain.   Respiratory: Negative for cough, sputum production and shortness of breath.    Cardiovascular: Negative for chest pain.   Gastrointestinal: Negative for nausea and vomiting.   Neurological: Negative for dizziness, sensory change, speech change and headaches.   Psychiatric/Behavioral: Negative for depression.        Physical Exam  Temp:  [36.2 °C (97.1 °F)-36.8 °C (98.2 °F)] 36.8 °C (98.2 °F)  Pulse:  [] 74  Resp:  [14-18] 15  BP: (119-154)/(62-88) 136/75  SpO2:  [91 %-97 %] 97 %    Physical Exam  Eyes:      General:         Right eye: No discharge.         Left eye: No discharge.      Conjunctiva/sclera:      Right eye: Right conjunctiva is injected. No exudate.     Left eye: Left conjunctiva is injected. No exudate.     Comments: Corkscrew  venous dilation of sclera with diffuse injection of both eyes    Cardiovascular:      Rate and Rhythm: Normal rate and regular rhythm.      Pulses: Normal pulses.   Pulmonary:      Effort: Pulmonary effort is normal.      Breath sounds: Normal breath sounds.   Abdominal:      General: Abdomen is flat.   Musculoskeletal:      Right lower leg: No edema.      Left lower leg: No edema.   Neurological:      General: No focal deficit present.      Mental Status: Mental status is at baseline.      Cranial Nerves: No cranial nerve deficit.      Sensory: No sensory deficit.   Psychiatric:         Mood and Affect: Mood normal.         Fluids  No intake or output data in the 24 hours ending 01/27/21 1132    Laboratory  Recent Labs     01/26/21  1720 01/27/21  0210   WBC 9.3 8.2   RBC 4.79 4.48   HEMOGLOBIN 15.4 14.2   HEMATOCRIT 46.7 43.6   MCV 97.5 97.3   MCH 32.2 31.7   MCHC 33.0* 32.6*   RDW 46.7 46.2   PLATELETCT 218 213   MPV 10.7 10.9     Recent Labs     01/26/21  1720 01/27/21  0210   SODIUM 140 139   POTASSIUM 4.5 4.2   CHLORIDE 102 101   CO2 26 26   GLUCOSE 118* 115*   BUN 15 18   CREATININE 0.85 0.79   CALCIUM 10.6* 9.4                   Imaging  MR-VENOGRAM (MRV) HEAD   Final Result      Cerebral magnetic resonance venogram within normal limits with no evidence of dural venous sinus thrombosis.      MR-MRA NECK-WITH & W/O AND SEQUENCES   Final Result      MRA of the neck within normal limits with no evidence of flow-limiting lesion.      MR-MRA HEAD-W/O   Final Result      MRA of the [place] within normal limits with no evidence of aneurysm or cerebrovascular occlusive disease.      MR-BRAIN-WITH & W/O   Final Result      1.  Mild chronic microvascular ischemic type changes.   2.  Mild age-related cerebral atrophy.   3.  No acute intracranial abnormality or pathologic enhancement.      IR-CAROTID-CEREBRAL BILATERAL    (Results Pending)        Assessment/Plan  Cerebrovascular dural AV fistula  Assessment &  Plan  MRI result reviewed by neuro-ophthalmologist  MRV consistent with possible dural AV fistula involving the left cavernous sinus  Scheduled for 4-vessel cerebral angiogram.       Type 2 diabetes mellitus (HCC)  Assessment & Plan  Patient has history of type 2 diabetes, last A1c unknown, but is managed on Metformin 1000 mg twice daily, Toujeo Solostar, and NovoLog insulin pen.  Considering patient is inpatient we will hold antihyperglycemics and modify insulin regimen.  -Hemoglobin A1c, B12, folate, vitamin D12 ordered  -Hypoglycemia protocol, SSI, diabetic diet  -Hold home antihyperglycemics  -Continue home vitamin B12, vitamin D3    Hyperlipidemia  Assessment & Plan  -Continue home atorvastatin    Hypertension  Assessment & Plan  Continue home medication    Asthma  Assessment & Plan  -Continue home montelukast       VTE prophylaxis: Heparin SC

## 2021-01-27 NOTE — H&P
DATE OF SERVICE:  01/26/2021     The patient was initially evaluated at the neurophthalmology clinic on   01/26/2021 and then was referred to [facility].  The   patient was screened for COVID-19 in accordance with the guidelines from the   CDC.  The patient's temperature was 97.9.  Dr. Hutchins was wearing an N95   respirator and the patient was wearing a mask in accordance with CDC   guidelines.     HISTORY OF PRESENT ILLNESS:  The patient is 70 years of age and has a history   of severe left orbital pain in combination with a left abducens palsy as well   as the superior division left ocular motor palsy.  The patient's symptoms   first began on 08/05/2020 at which time the patient underwent a root canal   procedure involving the upper molar on the left.  During and immediately   following the procedure, the patient had been doing reasonably well; however,   the following morning, the patient awoke from sleep with intense left-sided   orbital and periorbital pain.  The orbital pain was rated an 8.5 on a scale of   0-10.  The patient also had double vision and was noted to have a left   abducens palsy and the superior division left ocular motor palsy.  The patient   underwent emergency neural imaging studies.  The patient underwent an MRI   scan of the brain on 10/01/2020, which showed focal areas of high signal   intensity involving the right frontal and parietal cortices.  Overall, the   patient clinically improved following treatment with Medrol.     The Medrol was tapered slowly and was subsequently discontinued one week ago.    The patient reports that 3 weeks ago, she began to develop low-grade orbital   pain bilaterally, which was graded at a 3 on a scale of 0-10.  Concurrently,   the patient began to notice bilateral conjunctival injection (dilated   conjunctiva blood vessels) associated with a subjective bruit emanating from   the right ear.     ALLERGIES:  CLINDAMYCIN.      MEDICATIONS:  Include insulin, NovoLog, metformin, potassium chloride,   furosemide, atorvastatin, losartan, Naprosyn, montelukast, aspirin,   amoxicillin.  In addition, the patient has also been using Lumify four to five   times daily.     PAST SURGICAL HISTORY:  Unchanged compared to his previous examination.     FAMILY HISTORY:  Both parents have a history of Parkinson's disease.     SOCIAL HISTORY:  The patient does not smoke and rarely drinks alcohol.     REVIEW OF SYSTEMS:  The patient has a history of a cardiac arrhythmia,   tinnitus, arthritis and easy fatigability.     PHYSICAL EXAMINATION:  VISUAL ACUITY:  Best corrected visual acuity OD 20/20 and OS 20/20.  Near visual acuity OD J1 and OS J1 plus.  Color vision correctly recognizing 4.56 HRR plate OD and correctly recognizing   4.56 HRR plates OS.  Amsler grid testing normal.  Confrontation visual field normal.  PUPILS:  Both pupils measured 3.5 mm, briskly reactive to light.  Flicker fusion, OD 33 Hz, OS 31 Hz.  ____ 96 OD, 23 mm, OS 23 mm.  SLIT LAMP EXAMINATION:  There was a 3+ dilated corkscrew vessels primarily   located along the inferior conjunctiva, put that on both sides.  Tension, OD   33 and OS 34.  MOTILITY EXAMINATION:  External ocular motility examination was performed   primary gaze, 2 prism diopters of exotropia, right gaze 3, left gaze 4, upgaze   3, downgaze 2, near 8 prism diopters of exotropia, dilated ophthalmoscopy.    Cup-to-disc ratio measures 0.4.  The right optic nerve appeared normal.  There   were a few macular drusen.  The peripheral retina was normal.     OS, cup-to-disc ratio measures 0.4.  The left optic nerve, macular and   peripheral retina appeared normal.     Holt visual field, 30-24 ____.  There was mild to moderate blind spot   enlargement.  No evidence of foveal threshold 33 dB.  There is a mild inferior   arcuate .     OCT, the mean retinal nerve fiber layer thickness was reduced, OD at 89   microns and OS at  93 microns.  The macular thickness map was normal, OU.     FUNDUS:  External photography showed bilateral corkscrew vessels.     The neuro imaging studies were extensively reviewed with Dr. Burnett.     MRI:  An MRI scan of the brain with and without contrast was performed on   01/26/2021.  The MRI scan shows small vessel microvascular disease.  In   addition, there was side enhancement involving the left cavernous sinus.     MR angiogram of the head was normal.  MR angiogram of the neck normal.     MR venogram.  There is no evidence of dural venous sinus thrombosis.  A clear   cut dural AV fistula was not seen.     ASSESSMENT:  1.  Low flow dural arteriovenous fistula.  2.  Bilateral corkscrew vessel secondary to orbital venous congestion.  3.  Remote left cavernous sinus syndrome, primarily manifested as a left   superior division third nerve palsy in combination with a left abducens palsy.  4.  Left abducens palsy.  5.  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  6.  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  7.  Remote ischemic left abducens palsy 4/17.  8.  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  9.  Glaucoma suspect.  10.  Mild bilateral cataractous changes.     RECOMMENDATIONS:  1.  Latanoprost one drop OU at bedtime.  The patient was discussed with Dr. Mendez.  2.  The patient was next proceed with a 4-vessel cerebral angiogram.  The   films were carefully reviewed with Dr. Burnett.  It was our impression that   the patient may have a dural AV fistula involving the left low flow dural   arteriovenous fistula involving the left cavernous sinus.  3.  Follow up with neurophthalmology clinic upon discharge from the hospital.     Time spent two hours.  History and physical examination, extensive review of   the neuro imaging studies, diagnosis, differential diagnosis and treatment   strategies.        ______________________________  KELLEN NGUYEN,  DO RABAGO/[facility]    DD:  01/27/2021 12:40  DT:  01/27/2021 13:35    Job#:  [id number]    CC:Zak Mendez M.D.

## 2021-01-27 NOTE — DISCHARGE INSTRUCTIONS
Discharge Instructions    Discharged to home by car with relative. Discharged via wheelchair, hospital escort: Yes.  Special equipment needed: Not Applicable    Be sure to schedule a follow-up appointment with your primary care doctor or any specialists as instructed.     Discharge Plan:   Diet Plan: Discussed  Activity Level: Discussed  Confirmed Follow up Appointment: Patient to Call and Schedule Appointment  Confirmed Symptoms Management: Discussed  Medication Reconciliation Updated: Yes  Influenza Vaccine Indication: Not indicated: Previously immunized this influenza season and > 8 years of age    I understand that a diet low in cholesterol, fat, and sodium is recommended for good health. Unless I have been given specific instructions below for another diet, I accept this instruction as my diet prescription.   Other diet: Diabetic        · Is patient discharged on Warfarin / Coumadin?   No     Depression / Suicide Risk    As you are discharged from this [facility] facility, it is important to learn how to keep safe from harming yourself.    Recognize the warning signs:  · Abrupt changes in personality, positive or negative- including increase in energy   · Giving away possessions  · Change in eating patterns- significant weight changes-  positive or negative  · Change in sleeping patterns- unable to sleep or sleeping all the time   · Unwillingness or inability to communicate  · Depression  · Unusual sadness, discouragement and loneliness  · Talk of wanting to die  · Neglect of personal appearance   · Rebelliousness- reckless behavior  · Withdrawal from people/activities they love  · Confusion- inability to concentrate     If you or a loved one observes any of these behaviors or has concerns about self-harm, here's what you can do:  · Talk about it- your feelings and reasons for harming yourself  · Remove any means that you might use to hurt yourself (examples: pills, rope, extension cords, firearm)  · Get  professional help from the community (Mental Health, Substance Abuse, psychological counseling)  · Do not be alone:Call your Safe Contact- someone whom you trust who will be there for you.  · Call your local CRISIS HOTLINE 308-1352 or 565-676-6830  · Call your local Children's Mobile Crisis Response Team [place] (184) 280-9181 or www.[employer]  · Call the toll free National Suicide Prevention Hotlines   · National Suicide Prevention Lifeline 084-008-QPMU (6476)  · National Hope Line Network 800-SUICIDE (354-0924)

## 2021-01-27 NOTE — ASSESSMENT & PLAN NOTE
Patient has history of type 2 diabetes, last A1c unknown, but is managed on Metformin 1000 mg twice daily, Toujeo Solostar, and NovoLog insulin pen.  Considering patient is inpatient we will hold antihyperglycemics and modify insulin regimen.  -Hemoglobin A1c, B12, folate, vitamin D12 ordered  -Hypoglycemia protocol, SSI, diabetic diet  -Hold home antihyperglycemics  -Continue home vitamin B12, vitamin D3

## 2021-01-27 NOTE — CARE PLAN
Problem: Safety  Goal: Will remain free from falls  Outcome: PROGRESSING AS EXPECTED  Note: Bed alarm on. Bed in lowest, locked position. Call light and belongings within reach. Pt calls appropriately. Will continue hourly rounding.      Problem: Venous Thromboembolism (VTW)/Deep Vein Thrombosis (DVT) Prevention:  Goal: Patient will participate in Venous Thrombosis (VTE)/Deep Vein Thrombosis (DVT)Prevention Measures  Outcome: PROGRESSING AS EXPECTED  Note: Anticoagulation ordered. Pt ambulatory. Will continue to monitor.

## 2021-01-27 NOTE — ED NOTES
Med rec updated and complete. Allergies reviewed. Met with pt at bedside. Dicussed current medications and last doses taken.   Pt provided a detailed list.     No antibiotic use in last 14 days.      Home pharmacy   [facility].

## 2021-01-28 VITALS
OXYGEN SATURATION: 95 % | TEMPERATURE: 97.3 F | RESPIRATION RATE: 14 BRPM | HEART RATE: 65 BPM | SYSTOLIC BLOOD PRESSURE: 114 MMHG | HEIGHT: 67 IN | DIASTOLIC BLOOD PRESSURE: 61 MMHG | BODY MASS INDEX: 35.88 KG/M2 | WEIGHT: 228.62 LBS

## 2021-01-28 LAB
GLUCOSE BLD-MCNC: 160 MG/DL (ref 65–99)
NUCLEAR IGG SER QL IA: NORMAL

## 2021-01-28 PROCEDURE — 700102 HCHG RX REV CODE 250 W/ 637 OVERRIDE(OP): Performed by: INTERNAL MEDICINE

## 2021-01-28 PROCEDURE — A9270 NON-COVERED ITEM OR SERVICE: HCPCS | Performed by: STUDENT IN AN ORGANIZED HEALTH CARE EDUCATION/TRAINING PROGRAM

## 2021-01-28 PROCEDURE — 700102 HCHG RX REV CODE 250 W/ 637 OVERRIDE(OP): Performed by: STUDENT IN AN ORGANIZED HEALTH CARE EDUCATION/TRAINING PROGRAM

## 2021-01-28 PROCEDURE — 99239 HOSP IP/OBS DSCHRG MGMT >30: CPT | Performed by: STUDENT IN AN ORGANIZED HEALTH CARE EDUCATION/TRAINING PROGRAM

## 2021-01-28 PROCEDURE — A9270 NON-COVERED ITEM OR SERVICE: HCPCS | Performed by: INTERNAL MEDICINE

## 2021-01-28 PROCEDURE — 82962 GLUCOSE BLOOD TEST: CPT

## 2021-01-28 PROCEDURE — 700111 HCHG RX REV CODE 636 W/ 250 OVERRIDE (IP): Performed by: STUDENT IN AN ORGANIZED HEALTH CARE EDUCATION/TRAINING PROGRAM

## 2021-01-28 RX ORDER — LATANOPROST 50 UG/ML
1 SOLUTION/ DROPS OPHTHALMIC
Qty: 2.5 ML | Refills: 1 | Status: SHIPPED | OUTPATIENT
Start: 2021-01-28

## 2021-01-28 RX ORDER — OXYCODONE HYDROCHLORIDE 5 MG/1
5 TABLET ORAL EVERY 4 HOURS PRN
Status: DISCONTINUED | OUTPATIENT
Start: 2021-01-28 | End: 2021-01-28 | Stop reason: HOSPADM

## 2021-01-28 RX ADMIN — HEPARIN SODIUM 5000 UNITS: 5000 INJECTION, SOLUTION INTRAVENOUS; SUBCUTANEOUS at 05:15

## 2021-01-28 RX ADMIN — LOSARTAN POTASSIUM 50 MG: 50 TABLET, FILM COATED ORAL at 05:15

## 2021-01-28 RX ADMIN — CYANOCOBALAMIN TAB 500 MCG 1000 MCG: 500 TAB at 05:15

## 2021-01-28 RX ADMIN — MULTIPLE VITAMINS W/ MINERALS TAB 1 TABLET: TAB at 05:15

## 2021-01-28 RX ADMIN — OXYCODONE 5 MG: 5 TABLET ORAL at 00:23

## 2021-01-28 RX ADMIN — Medication 100 MG: at 05:15

## 2021-01-28 RX ADMIN — Medication 1000 UNITS: at 05:15

## 2021-01-28 RX ADMIN — ACETAMINOPHEN 650 MG: 325 TABLET, FILM COATED ORAL at 07:30

## 2021-01-28 RX ADMIN — INSULIN LISPRO 7 UNITS: 100 INJECTION, SOLUTION INTRAVENOUS; SUBCUTANEOUS at 09:23

## 2021-01-28 RX ADMIN — ASPIRIN 81 MG: 81 TABLET, COATED ORAL at 05:15

## 2021-01-28 NOTE — CARE PLAN
Problem: Safety  Goal: Will remain free from falls  Outcome: PROGRESSING AS EXPECTED  Note: Bed in lowest, locked position. Pt refusing bed alarm/educated on risk. Pt calls appropriately. Will continue hourly rounding.      Problem: Venous Thromboembolism (VTW)/Deep Vein Thrombosis (DVT) Prevention:  Goal: Patient will participate in Venous Thrombosis (VTE)/Deep Vein Thrombosis (DVT)Prevention Measures  Outcome: PROGRESSING AS EXPECTED  Note: SCDs refused. SubQ Heparin on board. Will continue to monitor.

## 2021-01-28 NOTE — DISCHARGE SUMMARY
Discharge Summary    CHIEF COMPLAINT ON ADMISSION  Chief Complaint   Patient presents with   • Sent by MD       Reason for Admission  sent by MD     Admission Date  1/26/2021    CODE STATUS  Full Code    HPI & HOSPITAL COURSE  70 female with past medical history of asthma, diabetes, diabetic neuropathy, hypertension, hyperlipidemia, migraine referred by neuro ophthalmologist for concern of dural AV fistula given bilateral increase in eye pressure associated with bilateral sectoral injection with chronic history of venous dilation.  Admitted under stroke unit for evaluation of cerebrovascular dural AV fistula.  MRI consistent with low flow dural arteriovenous fistula.  Subsequent four-vessel cerebral angiogram revealed bilateral dural artery venous fistula.  Evaluated by neuro-ophthalmologist Dr. Hutchins .  Discussed case with Dr. Hutchins patient required  Surgical intervention for dural AV fistula  in future . Patient cleared for discharge with outpatient follow-up in neuro-ophthalmology clinic in 4 weeks.  Discharge plan was discussed with the patient in detail.  Therefore, she is discharged in good and stable condition to home with close outpatient follow-up.    The patient met 2-midnight criteria for an inpatient stay at the time of discharge.    Discharge Date  1/28/2021          DISCHARGE DIAGNOSES  Active Problems:    Cerebrovascular dural AV fistula POA: Unknown    Type 2 diabetes mellitus (HCC) POA: Unknown    Hypertension POA: Unknown    Hyperlipidemia POA: Unknown    Asthma POA: Unknown  Resolved Problems:    * No resolved hospital problems. *      FOLLOW UP  No future appointments.  No follow-up provider specified.    MEDICATIONS ON DISCHARGE     Medication List      START taking these medications      Instructions   latanoprost 0.005 % Soln  Commonly known as: XALATAN   Administer 1 Drop into both eyes every bedtime.  Dose: 1 Drop        CONTINUE taking these medications      Instructions   aspirin EC  81 MG Tbec  Commonly known as: ECOTRIN   Take 81 mg by mouth every day.  Dose: 81 mg     atorvastatin 10 MG Tabs  Commonly known as: LIPITOR   Take 10 mg by mouth every day.  Dose: 10 mg     cyanocobalamin 1000 MCG Tabs  Commonly known as: VITAMIN B12   Take 1,000 mcg by mouth every day.  Dose: 1,000 mcg     furosemide 40 MG Tabs  Commonly known as: LASIX   Take 20 mg by mouth every day.  Dose: 20 mg     insulin aspart 100 UNIT/ML Soln  Commonly known as: NovoLOG   Inject 2-10 Units under the skin 3 times a day before meals. 151-200 = 2 units  201-250 = 4 units  251-300 = 6 units  301-350 = 8 units  351-400 = 10 units  Dose: 2-10 Units     losartan 100 MG Tabs  Commonly known as: COZAAR   Take 100 mg by mouth every day.  Dose: 100 mg     metformin 1000 MG tablet  Commonly known as: GLUCOPHAGE   Take 1,000 mg by mouth 2 times a day, with meals.  Dose: 1,000 mg     montelukast 10 MG Tabs  Commonly known as: SINGULAIR   Take 10 mg by mouth every bedtime.  Dose: 10 mg     therapeutic multivitamin-minerals Tabs   Take 1 Tab by mouth every day.  Dose: 1 Tab     thiamine 100 MG tablet  Commonly known as: THIAMINE   Take 100 mg by mouth every day.  Dose: 100 mg     Toujeo Max SoloStar 300 UNIT/ML Sopn  Generic drug: Insulin Glargine (2 Unit Dial)   Inject 36 Units under the skin every bedtime.  Dose: 36 Units     vitamin D 1000 Unit (25 mcg) Tabs  Commonly known as: cholecalciferol   Take 1,000 Units by mouth every day.  Dose: 1,000 Units        STOP taking these medications    Aleve 220 MG tablet  Generic drug: naproxen     Krill Oil 1000 MG Caps     magnesium oxide 400 MG Tabs tablet  Commonly known as: MAG-OX     potassium chloride SA 10 MEQ Tbcr  Commonly known as: K-DUR            Allergies  Allergies   Allergen Reactions   • Clindamycin      Hives       DIET  Orders Placed This Encounter   Procedures   • Diet Order Diet: Consistent CHO (Diabetic)     Standing Status:   Standing     Number of Occurrences:   1     Order  Specific Question:   Diet:     Answer:   Consistent CHO (Diabetic) [4]       ACTIVITY  As tolerated.  Weight bearing as tolerated    CONSULTATIONS   neurophthalmology     PROCEDURES  Diagnostic cerebral angiogram    LABORATORY  Lab Results   Component Value Date    SODIUM 139 01/27/2021    POTASSIUM 4.2 01/27/2021    CHLORIDE 101 01/27/2021    CO2 26 01/27/2021    GLUCOSE 115 (H) 01/27/2021    BUN 18 01/27/2021    CREATININE 0.79 01/27/2021        Lab Results   Component Value Date    WBC 8.2 01/27/2021    HEMOGLOBIN 14.2 01/27/2021    HEMATOCRIT 43.6 01/27/2021    PLATELETCT 213 01/27/2021        Total time of the discharge process exceeds 32 minutes.

## 2021-01-28 NOTE — PROGRESS NOTES
Patient educated on home care instructions, medications, diet, activity, follow up, seeking medical attention, angio site care. Verbalized understanding. Stored home meds and angioseal card given to pt.

## 2021-02-10 DIAGNOSIS — I67.1 CAROTID-CAVERNOUS FISTULA: ICD-10-CM

## 2021-02-23 NOTE — PROGRESS NOTES
"Neuro Interventional Service Consultation      Re: Darnell Kirkland     MRN: [id number]   : 1950    Darnell Kirkland was referred to our service by Lencho Hutchins DO. She is a 70 y.o. female seen in clinic for evaluation and possible neurovascular intervention. She is also under the care of Scout Alexis MD.    History of Present Illness:   initially presented with severe left orbital pain since August after a left upper molar root canal procedure 2020. She also noted the inability to move her left eye properly. Initial MRI from 2020 showed right frontal and left parietal FLAIR signal. She was treated with a steroid course and her left eye symptoms improved. After the steroid taper, she then developed bilateral eye pain, bilateral reddened conjunctiva, bilateral eye swelling, and a right ear bruit/ tinnitus that sounds like her heart beat in her ear. She also reports \"brain fog.\" Both parents had Parkinson's Disease and she was concerned that this was a similar symptom. 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 She has been referred to the Neuro Interventional Service for evaluation and management of these finding.     She is seen today for review of imaging studies and discussion of possible intervention for the fistula and aneurysm. Today, the patient reports ongoing symptoms in both eyes, left worse than right. She has been using acetazolamide, which is helping a little. She has headaches, mainly on the left, and blurred vision in both eyes. The right tinnitus is not as prominent today. The brain fog seems to be getting worse. She was taken off Aleve after her hospitalization for reasons unknown to her, and she has multiple complaints of joint pain from this. She is using a walker due to her arthritic pain. She denies problems with anesthesia in the past. Blood pressure is controlled with " medications. There is a history of smoking, quit in 2011. She is accompanied by her daughter Estelle to the appointment today. Estelle is on a leave of absence from work to take care of the patient due to her inability to drive due to her current condition.     Past Medical History:   Diagnosis Date   • Asthma    • Diabetes (HCC)    • Diabetic neuropathy (HCC)    • Head ache    • Hyperlipidemia    • Hypertension    • Migraine      No past surgical history on file.  Social History     Socioeconomic History   • Marital status:      Spouse name: Not on file   • Number of children: Not on file   • Years of education: Not on file   • Highest education level: Not on file   Occupational History   • Not on file   Tobacco Use   • Smoking status: Former Smoker     Quit date: 1/20/2011     Years since quitting: 10.1   • Smokeless tobacco: Never Used   Substance and Sexual Activity   • Alcohol use: Yes     Alcohol/week: 0.6 oz     Types: 1 Glasses of wine per week   • Drug use: Not Currently     Comment: previously took marijuana for headaches   • Sexual activity: Not on file   Other Topics Concern   • Not on file   Social History Narrative   • Not on file     Social Determinants of Health     Financial Resource Strain:    • Difficulty of Paying Living Expenses:    Food Insecurity:    • Worried About Running Out of Food in the Last Year:    • Ran Out of Food in the Last Year:    Transportation Needs:    • Lack of Transportation (Medical):    • Lack of Transportation (Non-Medical):    Physical Activity:    • Days of Exercise per Week:    • Minutes of Exercise per Session:    Stress:    • Feeling of Stress :    Social Connections:    • Frequency of Communication with Friends and Family:    • Frequency of Social Gatherings with Friends and Family:    • Attends [employer] Services:    • Active Member of Clubs or Organizations:    • Attends Club or Organization Meetings:    • Marital Status:    Intimate Partner Violence:    •  Fear of Current or Ex-Partner:    • Emotionally Abused:    • Physically Abused:    • Sexually Abused:      Family History   Problem Relation Age of Onset   • Diabetes Mother    • Hypertension Mother    • Parkinson's Disease Mother    • Diabetes Father    • Hypertension Father    • Parkinson's Disease Father        Review of Systems   Constitutional: Negative.  Negative for chills, diaphoresis, fever, malaise/fatigue and weight loss.   HENT: Positive for congestion. Negative for sore throat.    Eyes: Positive for blurred vision, pain and redness.   Respiratory: Negative.    Cardiovascular: Negative.    Musculoskeletal: Positive for back pain and joint pain.   Skin: Negative.    Neurological: Negative for sensory change, speech change, focal weakness and weakness.   Psychiatric/Behavioral: Negative for substance abuse.     A comprehensive 14-point review of systems was negative except as described above.     Labs:      Ref. Range 1/27/2021 02:10   WBC Latest Ref Range: 4.8 - 10.8 K/uL 8.2   RBC Latest Ref Range: 4.20 - 5.40 M/uL 4.48   Hemoglobin Latest Ref Range: 12.0 - 16.0 g/dL 14.2   Hematocrit Latest Ref Range: 37.0 - 47.0 % 43.6   MCV Latest Ref Range: 81.4 - 97.8 fL 97.3   MCH Latest Ref Range: 27.0 - 33.0 pg 31.7   MCHC Latest Ref Range: 33.6 - 35.0 g/dL 32.6 (L)   RDW Latest Ref Range: 35.9 - 50.0 fL 46.2   Platelet Count Latest Ref Range: 164 - 446 K/uL 213   MPV Latest Ref Range: 9.0 - 12.9 fL 10.9   Neutrophils-Polys Latest Ref Range: 44.00 - 72.00 % 55.20   Neutrophils (Absolute) Latest Ref Range: 2.00 - 7.15 K/uL 4.50   Lymphocytes Latest Ref Range: 22.00 - 41.00 % 30.60   Lymphs (Absolute) Latest Ref Range: 1.00 - 4.80 K/uL 2.49   Monocytes Latest Ref Range: 0.00 - 13.40 % 10.90   Monos (Absolute) Latest Ref Range: 0.00 - 0.85 K/uL 0.89 (H)   Eosinophils Latest Ref Range: 0.00 - 6.90 % 2.20   Eos (Absolute) Latest Ref Range: 0.00 - 0.51 K/uL 0.18   Basophils Latest Ref Range: 0.00 - 1.80 % 0.90   Myriam  (Absolute) Latest Ref Range: 0.00 - 0.12 K/uL 0.07   Immature Granulocytes Latest Ref Range: 0.00 - 0.90 % 0.20   Immature Granulocytes (abs) Latest Ref Range: 0.00 - 0.11 K/uL 0.02   Nucleated RBC Latest Units: /100 WBC 0.00   NRBC (Absolute) Latest Units: K/uL 0.00   Sodium Latest Ref Range: 135 - 145 mmol/L 139   Potassium Latest Ref Range: 3.6 - 5.5 mmol/L 4.2   Chloride Latest Ref Range: 96 - 112 mmol/L 101   Co2 Latest Ref Range: 20 - 33 mmol/L 26   Anion Gap Latest Ref Range: 7.0 - 16.0  12.0   Glucose Latest Ref Range: 65 - 99 mg/dL 115 (H)   Bun Latest Ref Range: 8 - 22 mg/dL 18   Creatinine Latest Ref Range: 0.50 - 1.40 mg/dL 0.79   GFR If  Latest Ref Range: >60 mL/min/1.73 m 2 >60   GFR If Non  Latest Ref Range: >60 mL/min/1.73 m 2 >60   Calcium Latest Ref Range: 8.5 - 10.5 mg/dL 9.4   AST(SGOT) Latest Ref Range: 12 - 45 U/L 24   ALT(SGPT) Latest Ref Range: 2 - 50 U/L 13   Alkaline Phosphatase Latest Ref Range: 30 - 99 U/L 38   Total Bilirubin Latest Ref Range: 0.1 - 1.5 mg/dL 0.3   Albumin Latest Ref Range: 3.2 - 4.9 g/dL 3.8   Total Protein Latest Ref Range: 6.0 - 8.2 g/dL 6.2   Globulin Latest Ref Range: 1.9 - 3.5 g/dL 2.4   A-G Ratio Latest Units: g/dL 1.6   Magnesium Latest Ref Range: 1.5 - 2.5 mg/dL 2.0      Ref. Range 1/27/2021 13:29   INR Latest Ref Range: 0.87 - 1.13  0.94   PT Latest Ref Range: 12.0 - 14.6 sec 12.9       Radiology:   Catheter angiogram on 1/27/21 at [facility]:  1.  Abnormal early enhancement of the bilateral cavernous sinuses and superior ophthalmic veins suggestive of low flow cavernous fistula predominantly supplied by bilateral branches of external carotid and internal carotid arteries. There is no direct communication of the internal carotid artery and the cavernous sinus to suggest high flow vascular malformation.  2.  An approximately 3 to 4 mm sized incidental right posterior communicating artery aneurysm.          [facility]21 [facility]:  Cerebral magnetic resonance venogram within normal limits with no evidence of dural venous sinus thrombosis.    MRA neck 1/26/21 at [facility]:  MRA of the neck within normal limits with no evidence of flow-limiting lesion.    MRA head 1/26/21 at [facility]:  MRA of the [place] within normal limits with no evidence of aneurysm or cerebrovascular occlusive disease.    MRI brain 1/26/21 at [facility]:  1.  Mild chronic microvascular ischemic type changes.  2.  Mild age-related cerebral atrophy.  3.  No acute intracranial abnormality or pathologic enhancement.    MRA head 11/10/20 at [facility]:  MRA OF THE [place] WITHIN NORMAL LIMITS.    MRA neck 11/10/20 at [facility]:  No abnormality in carotid and vertebral vessels on gadolinium enhanced MR angiogram.    MRI orbits 10/1/20 at [facility]:  MRI of the orbits without and with contrast within normal limits.    MRI brain 10/1/2020 at [facility]:  1.  No acute hemorrhage, ischemia or mass  2.  Focal areas of abnormal cortical FLAIR signal in the RIGHT frontal and LEFT parietal lobes, likely small areas of encephalomalacia  3.  Moderate atrophy  4.  Mild white matter changes  5.  Please the separately dictated MRI of the orbits    Current Outpatient Medications   Medication Sig Dispense Refill   • latanoprost (XALATAN) 0.005 % Solution Administer 1 Drop into both eyes every bedtime. 2.5 mL 1   • Insulin Glargine, 2 Unit Dial, (TOUJEO MAX SOLOSTAR) 300 UNIT/ML Solution Pen-injector Inject 36 Units under the skin every bedtime.     • insulin aspart (NOVOLOG) 100 UNIT/ML Solution Inject 2-10 Units under the skin 3 times a day before meals. 151-200 = 2 units  201-250 = 4 units  251-300 = 6 units  301-350 = 8 units  351-400 = 10 units     • metformin (GLUCOPHAGE) 1000 MG tablet Take 1,000 mg by mouth 2 times a day, with meals.     • furosemide (LASIX) 40 MG Tab Take 20 mg by mouth every day.     • atorvastatin (LIPITOR) 10 MG Tab Take 10 mg by mouth every day.     • losartan  (COZAAR) 100 MG Tab Take 100 mg by mouth every day.     • montelukast (SINGULAIR) 10 MG Tab Take 10 mg by mouth every bedtime.     • cyanocobalamin (VITAMIN B12) 1000 MCG Tab Take 1,000 mcg by mouth every day.     • thiamine (THIAMINE) 100 MG tablet Take 100 mg by mouth every day.     • vitamin D (CHOLECALCIFEROL) 1000 Unit (25 mcg) Tab Take 1,000 Units by mouth every day.     • therapeutic multivitamin-minerals (THERAGRAN-M) Tab Take 1 Tab by mouth every day.     • aspirin EC (ECOTRIN) 81 MG Tablet Delayed Response Take 81 mg by mouth every day.       No current facility-administered medications for this visit.       Allergies   Allergen Reactions   • Clindamycin      Hives       Physical Exam   Constitutional: She is oriented to person, place, and time and well-developed, well-nourished, and in no distress. No distress.   HENT:   Head: Normocephalic.   Eyes: Right conjunctiva is injected (swelling present). Left conjunctiva is injected (swelling present). No scleral icterus.   Pulmonary/Chest: Effort normal. No respiratory distress.   Abdominal: She exhibits no distension.   Neurological: She is alert and oriented to person, place, and time. She has normal sensation and normal strength. She is not agitated and not disoriented. She displays no weakness, no tremor, facial symmetry, normal stance and normal speech. No cranial nerve deficit. Gait normal. Coordination and gait normal.   Skin: Skin is warm and dry. No rash noted. She is not diaphoretic. No erythema. No pallor.   Psychiatric: Mood, memory, affect and judgment normal.     Impression:   1. 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  2. 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  3. 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  4. Hypertension, controlled.  5. Hyperlipidemia.   6. Asthma.   7. Diabetes mellitus.    Plan:   Rojelio Burnett MD has reviewed 's history and imaging studies,  "examined the patient, and discussed treatment options.  has complicated findings. There are TWO CCF on each laterality, as well as an incidental right posterior communicating artery aneurysm. She requires intervention on the CCFs. This will be arranged in at least 1 session per laterality and we explained that sometimes we need to address one finding and wait to address the other, depending on procedure time, medical radiation exposure, and contrast use. After the CCFs are successfully addressed we will discuss a plan for the incidental Right PCOM aneurysm, which is asymptomatic and does not contribute to her symptoms.  Aneurysms of this size and in this location carry a cumulative 5 year rupture risk of 2.5%; overall procedure risk is approximately 1-3%. We discussed the method of the embolization procedures at length including embolic materials and gave an overview of the anatomy contributing to her symptoms and potential endovascular techniques to treat those areas. Outcome expectations were discussed including resolution of her eye symptoms and right tinnitus. In regards to her complaints of \"brain fog,\" we advised her that if it does not improve after intervention she should continue to seek work up in the event it is caused by a separate disease process.    We additionally discussed the procedure risks, including bleeding and infection, damage to the arteries, reaction to any medications given during the procedure, side effects of contrast, radiation exposure, embolic migration, mechanical failure, stroke, hemorrhage, and death. There is a chance the fistulae may ultimately not be amenable to endovascular intervention. After the procedure, there is a chance that the fistulae could recur. We explained that the patient will need to have ongoing surveillance imaging after the procedure, which will be managed by us, and that future treatment depends on multiple factors including lab studies, imaging, and " performance status. The patient verbalizes understanding of risks, benefits, and alternatives and elects to proceed. Printed aneurysm education materials including stent information were provided for review at a later date Written pre- and post- procedure care instructions were provided. The patient has been scheduled for March 3 to address the left side, where her more severe symptoms are, and for a second procedure on March 31 to address either the residual left side CCF or begin treatment on the right CCF. She will hold metformin for each procedure and we will arrange for her to be admitted post procedure for neurologic monitoring.    DAVID So with Rojelio Burnett MD  Neuro Interventional Service   [facility]   [id number]  [place] [address]  [phone]

## 2021-02-24 ENCOUNTER — HOSPITAL ENCOUNTER (OUTPATIENT)
Dept: RADIOLOGY | Facility: MEDICAL CENTER | Age: 71
End: 2021-02-24
Attending: PSYCHIATRY & NEUROLOGY
Payer: MEDICARE

## 2021-02-24 ENCOUNTER — HOSPITAL ENCOUNTER (OUTPATIENT)
Facility: MEDICAL CENTER | Age: 71
End: 2021-02-24
Attending: RADIOLOGY | Admitting: RADIOLOGY
Payer: MEDICARE

## 2021-02-24 DIAGNOSIS — I67.1 CAROTID-CAVERNOUS FISTULA: ICD-10-CM

## 2021-02-24 ASSESSMENT — ENCOUNTER SYMPTOMS
WEAKNESS: 0
DIAPHORESIS: 0
SORE THROAT: 0
FEVER: 0
EYE PAIN: 1
BACK PAIN: 1
RESPIRATORY NEGATIVE: 1
SPEECH CHANGE: 0
CONSTITUTIONAL NEGATIVE: 1
WEIGHT LOSS: 0
CARDIOVASCULAR NEGATIVE: 1
EYE REDNESS: 1
CHILLS: 0
BLURRED VISION: 1
FOCAL WEAKNESS: 0
SENSORY CHANGE: 0

## 2021-02-24 ASSESSMENT — LIFESTYLE VARIABLES: SUBSTANCE_ABUSE: 0

## 2021-02-26 ENCOUNTER — PRE-ADMISSION TESTING (OUTPATIENT)
Dept: ADMISSIONS | Facility: MEDICAL CENTER | Age: 71
DRG: 271 | End: 2021-02-26
Attending: RADIOLOGY
Payer: MEDICARE

## 2021-02-26 DIAGNOSIS — Z01.812 PRE-OPERATIVE LABORATORY EXAMINATION: ICD-10-CM

## 2021-02-26 DIAGNOSIS — Z01.810 PRE-OPERATIVE CARDIOVASCULAR EXAMINATION: ICD-10-CM

## 2021-02-26 LAB
ANION GAP SERPL CALC-SCNC: 10 MMOL/L (ref 7–16)
BUN SERPL-MCNC: 30 MG/DL (ref 8–22)
CALCIUM SERPL-MCNC: 10.1 MG/DL (ref 8.5–10.5)
CHLORIDE SERPL-SCNC: 103 MMOL/L (ref 96–112)
CO2 SERPL-SCNC: 23 MMOL/L (ref 20–33)
CREAT SERPL-MCNC: 1.19 MG/DL (ref 0.5–1.4)
EKG IMPRESSION: NORMAL
ERYTHROCYTE [DISTWIDTH] IN BLOOD BY AUTOMATED COUNT: 48.5 FL (ref 35.9–50)
EST. AVERAGE GLUCOSE BLD GHB EST-MCNC: 154 MG/DL
GLUCOSE SERPL-MCNC: 148 MG/DL (ref 65–99)
HBA1C MFR BLD: 7 % (ref 4–5.6)
HCT VFR BLD AUTO: 43.2 % (ref 37–47)
HGB BLD-MCNC: 14.1 G/DL (ref 12–16)
INR PPP: 0.93 (ref 0.87–1.13)
MCH RBC QN AUTO: 32 PG (ref 27–33)
MCHC RBC AUTO-ENTMCNC: 32.6 G/DL (ref 33.6–35)
MCV RBC AUTO: 98 FL (ref 81.4–97.8)
PLATELET # BLD AUTO: 218 K/UL (ref 164–446)
PMV BLD AUTO: 10.8 FL (ref 9–12.9)
POTASSIUM SERPL-SCNC: 3.9 MMOL/L (ref 3.6–5.5)
PROTHROMBIN TIME: 12.8 SEC (ref 12–14.6)
RBC # BLD AUTO: 4.41 M/UL (ref 4.2–5.4)
SARS-COV-2 RNA RESP QL NAA+PROBE: NOTDETECTED
SODIUM SERPL-SCNC: 136 MMOL/L (ref 135–145)
SPECIMEN SOURCE: NORMAL
WBC # BLD AUTO: 7.9 K/UL (ref 4.8–10.8)

## 2021-02-26 PROCEDURE — C9803 HOPD COVID-19 SPEC COLLECT: HCPCS

## 2021-02-26 PROCEDURE — U0005 INFEC AGEN DETEC AMPLI PROBE: HCPCS

## 2021-02-26 PROCEDURE — 93010 ELECTROCARDIOGRAM REPORT: CPT | Performed by: INTERNAL MEDICINE

## 2021-02-26 PROCEDURE — 80048 BASIC METABOLIC PNL TOTAL CA: CPT

## 2021-02-26 PROCEDURE — 36415 COLL VENOUS BLD VENIPUNCTURE: CPT

## 2021-02-26 PROCEDURE — 93005 ELECTROCARDIOGRAM TRACING: CPT

## 2021-02-26 PROCEDURE — 85027 COMPLETE CBC AUTOMATED: CPT

## 2021-02-26 PROCEDURE — U0003 INFECTIOUS AGENT DETECTION BY NUCLEIC ACID (DNA OR RNA); SEVERE ACUTE RESPIRATORY SYNDROME CORONAVIRUS 2 (SARS-COV-2) (CORONAVIRUS DISEASE [COVID-19]), AMPLIFIED PROBE TECHNIQUE, MAKING USE OF HIGH THROUGHPUT TECHNOLOGIES AS DESCRIBED BY CMS-2020-01-R: HCPCS

## 2021-02-26 PROCEDURE — 85610 PROTHROMBIN TIME: CPT

## 2021-02-26 PROCEDURE — 83036 HEMOGLOBIN GLYCOSYLATED A1C: CPT

## 2021-02-26 RX ORDER — BEPOTASTINE BESILATE 15 MG/ML
1 SOLUTION/ DROPS OPHTHALMIC PRN
COMMUNITY
Start: 2021-02-19 | End: 2021-09-13

## 2021-02-26 RX ORDER — ACETAZOLAMIDE 250 MG/1
250 TABLET ORAL 2 TIMES DAILY
COMMUNITY
Start: 2021-02-12 | End: 2021-09-13

## 2021-02-26 RX ORDER — NAPROXEN 250 MG/1
500 TABLET ORAL 2 TIMES DAILY
Status: ON HOLD | COMMUNITY
End: 2021-09-27

## 2021-02-26 RX ORDER — DORZOLAMIDE HYDROCHLORIDE AND TIMOLOL MALEATE 20; 5 MG/ML; MG/ML
1 SOLUTION/ DROPS OPHTHALMIC 2 TIMES DAILY
COMMUNITY
Start: 2021-02-04 | End: 2021-09-13

## 2021-02-26 RX ORDER — DIPHENHYDRAMINE HCL 25 MG
25 TABLET ORAL NIGHTLY PRN
COMMUNITY
End: 2021-09-13

## 2021-02-26 ASSESSMENT — FIBROSIS 4 INDEX: FIB4 SCORE: 2.16

## 2021-02-26 NOTE — PREPROCEDURE INSTRUCTIONS
Pt instructed to continue current medications, hold diuretics, metformin, cozaar DOS; instructed pt and dtr to get instructions from MD regarding insulin administration. Hold NSAIDS including naproxen and ASA/vitamins/supplements per anesthesia protocol/MD instructions.

## 2021-03-03 ENCOUNTER — ANESTHESIA EVENT (OUTPATIENT)
Dept: RADIOLOGY | Facility: MEDICAL CENTER | Age: 71
DRG: 271 | End: 2021-03-03
Payer: MEDICARE

## 2021-03-03 ENCOUNTER — ANESTHESIA (OUTPATIENT)
Dept: RADIOLOGY | Facility: MEDICAL CENTER | Age: 71
DRG: 271 | End: 2021-03-03
Payer: MEDICARE

## 2021-03-03 ENCOUNTER — HOSPITAL ENCOUNTER (INPATIENT)
Facility: MEDICAL CENTER | Age: 71
LOS: 3 days | DRG: 271 | End: 2021-03-06
Attending: RADIOLOGY | Admitting: RADIOLOGY
Payer: MEDICARE

## 2021-03-03 ENCOUNTER — APPOINTMENT (OUTPATIENT)
Dept: RADIOLOGY | Facility: MEDICAL CENTER | Age: 71
DRG: 271 | End: 2021-03-03
Attending: RADIOLOGY
Payer: MEDICARE

## 2021-03-03 DIAGNOSIS — I67.1 CAROTID ARTERY-CAVERNOUS SINUS FISTULA: ICD-10-CM

## 2021-03-03 DIAGNOSIS — R33.9 URINARY RETENTION: ICD-10-CM

## 2021-03-03 LAB
ACT BLD: 186 SEC (ref 74–137)
ACT BLD: 208 SEC (ref 74–137)
GLUCOSE BLD-MCNC: 113 MG/DL (ref 65–99)
GLUCOSE BLD-MCNC: 146 MG/DL (ref 65–99)
GLUCOSE BLD-MCNC: 164 MG/DL (ref 65–99)
GLUCOSE BLD-MCNC: 197 MG/DL (ref 65–99)

## 2021-03-03 PROCEDURE — 61624 TCAT PERM OCCLS/EMBOLJ CNS: CPT

## 2021-03-03 PROCEDURE — 96374 THER/PROPH/DIAG INJ IV PUSH: CPT

## 2021-03-03 PROCEDURE — 75898 FOLLOW-UP ANGIOGRAPHY: CPT

## 2021-03-03 PROCEDURE — 03LG3DZ OCCLUSION OF INTRACRANIAL ARTERY WITH INTRALUMINAL DEVICE, PERCUTANEOUS APPROACH: ICD-10-PCS | Performed by: RADIOLOGY

## 2021-03-03 PROCEDURE — B31C1ZZ FLUOROSCOPY OF BILATERAL EXTERNAL CAROTID ARTERIES USING LOW OSMOLAR CONTRAST: ICD-10-PCS | Performed by: RADIOLOGY

## 2021-03-03 PROCEDURE — 700111 HCHG RX REV CODE 636 W/ 250 OVERRIDE (IP): Performed by: ANESTHESIOLOGY

## 2021-03-03 PROCEDURE — 4410455 IR-EMBOLIZE-NEURO-EXTRACRANIAL

## 2021-03-03 PROCEDURE — A9270 NON-COVERED ITEM OR SERVICE: HCPCS | Performed by: ANESTHESIOLOGY

## 2021-03-03 PROCEDURE — 700117 HCHG RX CONTRAST REV CODE 255: Performed by: RADIOLOGY

## 2021-03-03 PROCEDURE — 700105 HCHG RX REV CODE 258: Performed by: RADIOLOGY

## 2021-03-03 PROCEDURE — 96376 TX/PRO/DX INJ SAME DRUG ADON: CPT

## 2021-03-03 PROCEDURE — B41C1ZZ FLUOROSCOPY OF PELVIC ARTERIES USING LOW OSMOLAR CONTRAST: ICD-10-PCS | Performed by: RADIOLOGY

## 2021-03-03 PROCEDURE — 96372 THER/PROPH/DIAG INJ SC/IM: CPT

## 2021-03-03 PROCEDURE — A9270 NON-COVERED ITEM OR SERVICE: HCPCS | Performed by: HOSPITALIST

## 2021-03-03 PROCEDURE — B3171ZZ FLUOROSCOPY OF LEFT INTERNAL CAROTID ARTERY USING LOW OSMOLAR CONTRAST: ICD-10-PCS | Performed by: RADIOLOGY

## 2021-03-03 PROCEDURE — B31R1ZZ FLUOROSCOPY OF INTRACRANIAL ARTERIES USING LOW OSMOLAR CONTRAST: ICD-10-PCS | Performed by: RADIOLOGY

## 2021-03-03 PROCEDURE — 96375 TX/PRO/DX INJ NEW DRUG ADDON: CPT

## 2021-03-03 PROCEDURE — 770022 HCHG ROOM/CARE - ICU (200)

## 2021-03-03 PROCEDURE — 700101 HCHG RX REV CODE 250

## 2021-03-03 PROCEDURE — 82962 GLUCOSE BLOOD TEST: CPT | Mod: 91

## 2021-03-03 PROCEDURE — 85347 COAGULATION TIME ACTIVATED: CPT

## 2021-03-03 PROCEDURE — 700101 HCHG RX REV CODE 250: Performed by: ANESTHESIOLOGY

## 2021-03-03 PROCEDURE — 99223 1ST HOSP IP/OBS HIGH 75: CPT | Mod: AI | Performed by: HOSPITALIST

## 2021-03-03 PROCEDURE — 700102 HCHG RX REV CODE 250 W/ 637 OVERRIDE(OP): Performed by: ANESTHESIOLOGY

## 2021-03-03 PROCEDURE — 160002 HCHG RECOVERY MINUTES (STAT)

## 2021-03-03 PROCEDURE — 75894 X-RAYS TRANSCATH THERAPY: CPT

## 2021-03-03 PROCEDURE — 700102 HCHG RX REV CODE 250 W/ 637 OVERRIDE(OP): Performed by: HOSPITALIST

## 2021-03-03 PROCEDURE — 700111 HCHG RX REV CODE 636 W/ 250 OVERRIDE (IP): Performed by: HOSPITALIST

## 2021-03-03 PROCEDURE — 99291 CRITICAL CARE FIRST HOUR: CPT | Performed by: NURSE PRACTITIONER

## 2021-03-03 RX ORDER — SODIUM CHLORIDE, SODIUM LACTATE, POTASSIUM CHLORIDE, CALCIUM CHLORIDE 600; 310; 30; 20 MG/100ML; MG/100ML; MG/100ML; MG/100ML
INJECTION, SOLUTION INTRAVENOUS CONTINUOUS
Status: DISCONTINUED | OUTPATIENT
Start: 2021-03-03 | End: 2021-03-03 | Stop reason: HOSPADM

## 2021-03-03 RX ORDER — LATANOPROST 50 UG/ML
1 SOLUTION/ DROPS OPHTHALMIC
Status: DISCONTINUED | OUTPATIENT
Start: 2021-03-03 | End: 2021-03-03

## 2021-03-03 RX ORDER — HALOPERIDOL 5 MG/ML
1 INJECTION INTRAMUSCULAR
Status: DISCONTINUED | OUTPATIENT
Start: 2021-03-03 | End: 2021-03-03 | Stop reason: HOSPADM

## 2021-03-03 RX ORDER — DORZOLAMIDE HYDROCHLORIDE AND TIMOLOL MALEATE 20; 5 MG/ML; MG/ML
1 SOLUTION/ DROPS OPHTHALMIC 2 TIMES DAILY
Status: DISCONTINUED | OUTPATIENT
Start: 2021-03-03 | End: 2021-03-06 | Stop reason: HOSPADM

## 2021-03-03 RX ORDER — ACETAMINOPHEN 325 MG/1
650 TABLET ORAL EVERY 6 HOURS PRN
Status: DISCONTINUED | OUTPATIENT
Start: 2021-03-03 | End: 2021-03-06 | Stop reason: HOSPADM

## 2021-03-03 RX ORDER — DIPHENHYDRAMINE HYDROCHLORIDE 50 MG/ML
12.5 INJECTION INTRAMUSCULAR; INTRAVENOUS
Status: DISCONTINUED | OUTPATIENT
Start: 2021-03-03 | End: 2021-03-03 | Stop reason: HOSPADM

## 2021-03-03 RX ORDER — INSULIN GLARGINE 100 [IU]/ML
30 INJECTION, SOLUTION SUBCUTANEOUS
Status: DISCONTINUED | OUTPATIENT
Start: 2021-03-03 | End: 2021-03-06 | Stop reason: HOSPADM

## 2021-03-03 RX ORDER — BISACODYL 10 MG
10 SUPPOSITORY, RECTAL RECTAL
Status: DISCONTINUED | OUTPATIENT
Start: 2021-03-03 | End: 2021-03-06 | Stop reason: HOSPADM

## 2021-03-03 RX ORDER — HEPARIN SODIUM 1000 [USP'U]/ML
INJECTION, SOLUTION INTRAVENOUS; SUBCUTANEOUS
Status: COMPLETED
Start: 2021-03-03 | End: 2021-03-03

## 2021-03-03 RX ORDER — GAUZE BANDAGE 2" X 2"
100 BANDAGE TOPICAL DAILY
Status: DISCONTINUED | OUTPATIENT
Start: 2021-03-04 | End: 2021-03-06 | Stop reason: HOSPADM

## 2021-03-03 RX ORDER — HYDRALAZINE HYDROCHLORIDE 20 MG/ML
20 INJECTION INTRAMUSCULAR; INTRAVENOUS EVERY 6 HOURS PRN
Status: DISCONTINUED | OUTPATIENT
Start: 2021-03-03 | End: 2021-03-06 | Stop reason: HOSPADM

## 2021-03-03 RX ORDER — ONDANSETRON 4 MG/1
4 TABLET, ORALLY DISINTEGRATING ORAL EVERY 4 HOURS PRN
Status: DISCONTINUED | OUTPATIENT
Start: 2021-03-03 | End: 2021-03-06 | Stop reason: HOSPADM

## 2021-03-03 RX ORDER — DEXTROSE MONOHYDRATE 25 G/50ML
50 INJECTION, SOLUTION INTRAVENOUS
Status: DISCONTINUED | OUTPATIENT
Start: 2021-03-03 | End: 2021-03-03 | Stop reason: HOSPADM

## 2021-03-03 RX ORDER — ACETAZOLAMIDE 250 MG/1
250 TABLET ORAL 2 TIMES DAILY
Status: DISCONTINUED | OUTPATIENT
Start: 2021-03-03 | End: 2021-03-06 | Stop reason: HOSPADM

## 2021-03-03 RX ORDER — LABETALOL HYDROCHLORIDE 5 MG/ML
INJECTION, SOLUTION INTRAVENOUS
Status: COMPLETED
Start: 2021-03-03 | End: 2021-03-03

## 2021-03-03 RX ORDER — ONDANSETRON 2 MG/ML
4 INJECTION INTRAMUSCULAR; INTRAVENOUS
Status: COMPLETED | OUTPATIENT
Start: 2021-03-03 | End: 2021-03-03

## 2021-03-03 RX ORDER — OXYCODONE HYDROCHLORIDE 5 MG/1
2.5 TABLET ORAL
Status: DISCONTINUED | OUTPATIENT
Start: 2021-03-03 | End: 2021-03-06 | Stop reason: HOSPADM

## 2021-03-03 RX ORDER — ATORVASTATIN CALCIUM 10 MG/1
10 TABLET, FILM COATED ORAL EVERY EVENING
Status: DISCONTINUED | OUTPATIENT
Start: 2021-03-03 | End: 2021-03-06 | Stop reason: HOSPADM

## 2021-03-03 RX ORDER — LABETALOL HYDROCHLORIDE 5 MG/ML
10 INJECTION, SOLUTION INTRAVENOUS ONCE
Status: ACTIVE | OUTPATIENT
Start: 2021-03-03 | End: 2021-03-04

## 2021-03-03 RX ORDER — FUROSEMIDE 20 MG/1
20 TABLET ORAL DAILY
Status: DISCONTINUED | OUTPATIENT
Start: 2021-03-04 | End: 2021-03-04

## 2021-03-03 RX ORDER — SODIUM CHLORIDE, SODIUM LACTATE, POTASSIUM CHLORIDE, CALCIUM CHLORIDE 600; 310; 30; 20 MG/100ML; MG/100ML; MG/100ML; MG/100ML
INJECTION, SOLUTION INTRAVENOUS CONTINUOUS
Status: ACTIVE | OUTPATIENT
Start: 2021-03-03 | End: 2021-03-03

## 2021-03-03 RX ORDER — MONTELUKAST SODIUM 10 MG/1
10 TABLET ORAL
Status: DISCONTINUED | OUTPATIENT
Start: 2021-03-03 | End: 2021-03-06 | Stop reason: HOSPADM

## 2021-03-03 RX ORDER — MEPERIDINE HYDROCHLORIDE 25 MG/ML
12.5 INJECTION INTRAMUSCULAR; INTRAVENOUS; SUBCUTANEOUS
Status: DISCONTINUED | OUTPATIENT
Start: 2021-03-03 | End: 2021-03-03 | Stop reason: HOSPADM

## 2021-03-03 RX ORDER — HEPARIN SODIUM,PORCINE 1000/ML
VIAL (ML) INJECTION PRN
Status: DISCONTINUED | OUTPATIENT
Start: 2021-03-03 | End: 2021-03-03 | Stop reason: SURG

## 2021-03-03 RX ORDER — HYDROMORPHONE HYDROCHLORIDE 1 MG/ML
0.2 INJECTION, SOLUTION INTRAMUSCULAR; INTRAVENOUS; SUBCUTANEOUS
Status: DISCONTINUED | OUTPATIENT
Start: 2021-03-03 | End: 2021-03-03 | Stop reason: HOSPADM

## 2021-03-03 RX ORDER — POLYETHYLENE GLYCOL 3350 17 G/17G
1 POWDER, FOR SOLUTION ORAL
Status: DISCONTINUED | OUTPATIENT
Start: 2021-03-03 | End: 2021-03-06 | Stop reason: HOSPADM

## 2021-03-03 RX ORDER — LIDOCAINE HYDROCHLORIDE 20 MG/ML
INJECTION, SOLUTION EPIDURAL; INFILTRATION; INTRACAUDAL; PERINEURAL PRN
Status: DISCONTINUED | OUTPATIENT
Start: 2021-03-03 | End: 2021-03-03 | Stop reason: SURG

## 2021-03-03 RX ORDER — ONDANSETRON 2 MG/ML
INJECTION INTRAMUSCULAR; INTRAVENOUS PRN
Status: DISCONTINUED | OUTPATIENT
Start: 2021-03-03 | End: 2021-03-03 | Stop reason: SURG

## 2021-03-03 RX ORDER — CEFAZOLIN SODIUM 1 G/3ML
INJECTION, POWDER, FOR SOLUTION INTRAMUSCULAR; INTRAVENOUS PRN
Status: DISCONTINUED | OUTPATIENT
Start: 2021-03-03 | End: 2021-03-03 | Stop reason: SURG

## 2021-03-03 RX ORDER — LABETALOL HYDROCHLORIDE 5 MG/ML
10-20 INJECTION, SOLUTION INTRAVENOUS EVERY 4 HOURS PRN
Status: DISCONTINUED | OUTPATIENT
Start: 2021-03-03 | End: 2021-03-06 | Stop reason: HOSPADM

## 2021-03-03 RX ORDER — HYDROMORPHONE HYDROCHLORIDE 1 MG/ML
0.25 INJECTION, SOLUTION INTRAMUSCULAR; INTRAVENOUS; SUBCUTANEOUS
Status: DISCONTINUED | OUTPATIENT
Start: 2021-03-03 | End: 2021-03-06 | Stop reason: HOSPADM

## 2021-03-03 RX ORDER — LABETALOL HYDROCHLORIDE 5 MG/ML
INJECTION, SOLUTION INTRAVENOUS PRN
Status: DISCONTINUED | OUTPATIENT
Start: 2021-03-03 | End: 2021-03-03 | Stop reason: SURG

## 2021-03-03 RX ORDER — OXYCODONE HYDROCHLORIDE AND ACETAMINOPHEN 5; 325 MG/1; MG/1
1 TABLET ORAL
Status: COMPLETED | OUTPATIENT
Start: 2021-03-03 | End: 2021-03-03

## 2021-03-03 RX ORDER — CHOLECALCIFEROL (VITAMIN D3) 125 MCG
1000 CAPSULE ORAL DAILY
Status: DISCONTINUED | OUTPATIENT
Start: 2021-03-04 | End: 2021-03-06 | Stop reason: HOSPADM

## 2021-03-03 RX ORDER — LOSARTAN POTASSIUM 50 MG/1
100 TABLET ORAL EVERY EVENING
Status: DISCONTINUED | OUTPATIENT
Start: 2021-03-03 | End: 2021-03-06 | Stop reason: HOSPADM

## 2021-03-03 RX ORDER — AMOXICILLIN 250 MG
2 CAPSULE ORAL 2 TIMES DAILY
Status: DISCONTINUED | OUTPATIENT
Start: 2021-03-03 | End: 2021-03-06 | Stop reason: HOSPADM

## 2021-03-03 RX ORDER — OXYCODONE HYDROCHLORIDE AND ACETAMINOPHEN 5; 325 MG/1; MG/1
2 TABLET ORAL
Status: COMPLETED | OUTPATIENT
Start: 2021-03-03 | End: 2021-03-03

## 2021-03-03 RX ORDER — HYDROMORPHONE HYDROCHLORIDE 1 MG/ML
0.4 INJECTION, SOLUTION INTRAMUSCULAR; INTRAVENOUS; SUBCUTANEOUS
Status: DISCONTINUED | OUTPATIENT
Start: 2021-03-03 | End: 2021-03-03 | Stop reason: HOSPADM

## 2021-03-03 RX ORDER — ONDANSETRON 2 MG/ML
4 INJECTION INTRAMUSCULAR; INTRAVENOUS EVERY 4 HOURS PRN
Status: DISCONTINUED | OUTPATIENT
Start: 2021-03-03 | End: 2021-03-06 | Stop reason: HOSPADM

## 2021-03-03 RX ORDER — MIDAZOLAM HYDROCHLORIDE 1 MG/ML
1 INJECTION INTRAMUSCULAR; INTRAVENOUS
Status: DISCONTINUED | OUTPATIENT
Start: 2021-03-03 | End: 2021-03-03 | Stop reason: HOSPADM

## 2021-03-03 RX ORDER — HYDROMORPHONE HYDROCHLORIDE 1 MG/ML
0.1 INJECTION, SOLUTION INTRAMUSCULAR; INTRAVENOUS; SUBCUTANEOUS
Status: DISCONTINUED | OUTPATIENT
Start: 2021-03-03 | End: 2021-03-03 | Stop reason: HOSPADM

## 2021-03-03 RX ORDER — OXYCODONE HYDROCHLORIDE 5 MG/1
5 TABLET ORAL
Status: DISCONTINUED | OUTPATIENT
Start: 2021-03-03 | End: 2021-03-06 | Stop reason: HOSPADM

## 2021-03-03 RX ORDER — DEXTROSE MONOHYDRATE 25 G/50ML
50 INJECTION, SOLUTION INTRAVENOUS
Status: DISCONTINUED | OUTPATIENT
Start: 2021-03-03 | End: 2021-03-04

## 2021-03-03 RX ADMIN — EPHEDRINE SULFATE 25 MG: 50 INJECTION INTRAMUSCULAR; INTRAVENOUS; SUBCUTANEOUS at 08:11

## 2021-03-03 RX ADMIN — DORZOLAMIDE HYDROCHLORIDE AND TIMOLOL MALEATE 1 DROP: 20; 5 SOLUTION/ DROPS OPHTHALMIC at 17:13

## 2021-03-03 RX ADMIN — HEPARIN SODIUM 2000 UNITS: 1000 INJECTION, SOLUTION INTRAVENOUS; SUBCUTANEOUS at 10:04

## 2021-03-03 RX ADMIN — SODIUM CHLORIDE, POTASSIUM CHLORIDE, SODIUM LACTATE AND CALCIUM CHLORIDE: 600; 310; 30; 20 INJECTION, SOLUTION INTRAVENOUS at 06:55

## 2021-03-03 RX ADMIN — HYDROMORPHONE HYDROCHLORIDE 0.25 MG: 1 INJECTION, SOLUTION INTRAMUSCULAR; INTRAVENOUS; SUBCUTANEOUS at 21:15

## 2021-03-03 RX ADMIN — LIDOCAINE HYDROCHLORIDE 50 MG: 20 INJECTION, SOLUTION EPIDURAL; INFILTRATION; INTRACAUDAL at 08:11

## 2021-03-03 RX ADMIN — CEFAZOLIN 2 G: 330 INJECTION, POWDER, FOR SOLUTION INTRAMUSCULAR; INTRAVENOUS at 08:11

## 2021-03-03 RX ADMIN — ONDANSETRON 4 MG: 2 INJECTION INTRAMUSCULAR; INTRAVENOUS at 20:23

## 2021-03-03 RX ADMIN — ACETAZOLAMIDE 250 MG: 250 TABLET ORAL at 17:13

## 2021-03-03 RX ADMIN — HEPARIN SODIUM 1000 UNITS: 1000 INJECTION, SOLUTION INTRAVENOUS; SUBCUTANEOUS at 11:01

## 2021-03-03 RX ADMIN — ONDANSETRON 4 MG: 2 INJECTION INTRAMUSCULAR; INTRAVENOUS at 08:11

## 2021-03-03 RX ADMIN — DOCUSATE SODIUM 50 MG AND SENNOSIDES 8.6 MG 2 TABLET: 8.6; 5 TABLET, FILM COATED ORAL at 17:12

## 2021-03-03 RX ADMIN — OXYCODONE 5 MG: 5 TABLET ORAL at 23:17

## 2021-03-03 RX ADMIN — LABETALOL HYDROCHLORIDE 10 MG: 5 INJECTION, SOLUTION INTRAVENOUS at 10:38

## 2021-03-03 RX ADMIN — OXYCODONE 2.5 MG: 5 TABLET ORAL at 14:05

## 2021-03-03 RX ADMIN — LABETALOL HYDROCHLORIDE 10 MG: 5 INJECTION, SOLUTION INTRAVENOUS at 12:45

## 2021-03-03 RX ADMIN — MONTELUKAST 10 MG: 10 TABLET, FILM COATED ORAL at 20:13

## 2021-03-03 RX ADMIN — LOSARTAN POTASSIUM 100 MG: 50 TABLET, FILM COATED ORAL at 17:12

## 2021-03-03 RX ADMIN — HEPARIN SODIUM 5000 UNITS: 1000 INJECTION, SOLUTION INTRAVENOUS; SUBCUTANEOUS at 08:59

## 2021-03-03 RX ADMIN — ATORVASTATIN CALCIUM 10 MG: 10 TABLET, FILM COATED ORAL at 17:12

## 2021-03-03 RX ADMIN — PROPOFOL 200 MG: 10 INJECTION, EMULSION INTRAVENOUS at 08:11

## 2021-03-03 RX ADMIN — OXYCODONE HYDROCHLORIDE AND ACETAMINOPHEN 1 TABLET: 5; 325 TABLET ORAL at 12:17

## 2021-03-03 RX ADMIN — SUGAMMADEX 200 MG: 100 INJECTION, SOLUTION INTRAVENOUS at 10:44

## 2021-03-03 RX ADMIN — IOHEXOL 130 ML: 300 INJECTION, SOLUTION INTRAVENOUS at 11:28

## 2021-03-03 RX ADMIN — INSULIN GLARGINE 30 UNITS: 100 INJECTION, SOLUTION SUBCUTANEOUS at 21:08

## 2021-03-03 RX ADMIN — INSULIN HUMAN 2 UNITS: 100 INJECTION, SOLUTION PARENTERAL at 21:08

## 2021-03-03 RX ADMIN — HYDROMORPHONE HYDROCHLORIDE 0.25 MG: 1 INJECTION, SOLUTION INTRAMUSCULAR; INTRAVENOUS; SUBCUTANEOUS at 17:05

## 2021-03-03 RX ADMIN — OXYCODONE 2.5 MG: 5 TABLET ORAL at 20:13

## 2021-03-03 RX ADMIN — ONDANSETRON 4 MG: 2 INJECTION INTRAMUSCULAR; INTRAVENOUS at 12:17

## 2021-03-03 ASSESSMENT — PAIN DESCRIPTION - PAIN TYPE
TYPE: ACUTE PAIN
TYPE: ACUTE PAIN
TYPE: ACUTE PAIN;SURGICAL PAIN
TYPE: ACUTE PAIN
TYPE: SURGICAL PAIN
TYPE: ACUTE PAIN
TYPE: SURGICAL PAIN

## 2021-03-03 ASSESSMENT — ENCOUNTER SYMPTOMS
NERVOUS/ANXIOUS: 0
FEVER: 0
PHOTOPHOBIA: 1
COUGH: 0
DIZZINESS: 0
WHEEZING: 0
CHILLS: 0
FALLS: 0
FOCAL WEAKNESS: 0
SHORTNESS OF BREATH: 0
NAUSEA: 0
MYALGIAS: 0
INSOMNIA: 0
PALPITATIONS: 0
EYE REDNESS: 1
EYE PAIN: 1
VOMITING: 0
HEADACHES: 0

## 2021-03-03 ASSESSMENT — PAIN SCALES - GENERAL: PAIN_LEVEL: 0

## 2021-03-03 ASSESSMENT — FIBROSIS 4 INDEX
FIB4 SCORE: 2.11
FIB4 SCORE: 2.11

## 2021-03-03 ASSESSMENT — COGNITIVE AND FUNCTIONAL STATUS - GENERAL: TURNING FROM BACK TO SIDE WHILE IN FLAT BAD: A LITTLE

## 2021-03-03 NOTE — OR NURSING
"1330-Transported to RICU.Pt. neuro.V/S WNL as assessed by x3 MD'S. In PACU as well.Bilat groin sites remain clear from any bleeding nor hematoma's.Medicated for C/O\"slight headache\".  Pt's. daughter was updated.  "

## 2021-03-03 NOTE — PROGRESS NOTES
[facility] TRIAGE OFFICER REPORT  Consult/Admission requested by: Dr Burnett  Chief complaint: S/p embolization of cerebrovascular dural AVF  Pertinent history/ER Course: Patient undergoing Neuro IR embolization, will need post operative monitoring in the ICU for frequent neurochecks.  Code Status: Full Code per prior charting  Patient meets admission criterion: Yes..  Recommendations given or work up & consultations requested per triage officer: None  Consultants involved and pertinent input from consultants: IR Dr. Burnett - Recommends hourly neurochecks requiring ICU level of care, no blood thinners following procedure, and goal SBP < 140  Admission status: Observation.   Admission order placed: Yes.   Floor requested: [facility]. Discussed with intensivist Dr. Gonda   Assigned hospitalist: Will be assigned following procedure.    Please notify Triage Officer following procedure to complete admission.

## 2021-03-03 NOTE — PROGRESS NOTES
IR RN note    Patient underwent a Cerebral Angiogram with embolization of the Left Carticocavernous fistula by Dr. Burnett.  Procedure site was verified by MD using imaging guidance. Consent was signed.  BRIAN Mahmood and Amrita assisted. Patient was placed in a Supine position.  General Anesthesia provided by MD Peirce. MD Pierce monitored vitals during the procedure.   Right and Left groin access site.   A angioseal, gauze and tegaderm were placed on the right side; gauze and tegaderm were placed on the left side over surgical site. Report called to Timothy CHAMPION. Pt transported by bed with MD Pierce to [facility] PACU 12B.    Penumbra Smart Coil 3 mm x 8 cm Ophthalmic Vein 1st coil  REF # [id number]  LOT # [id number]  Exp. 5/17/23    Penumbra Smart Coil 3 mm x 8 cm Ophthalmic Vein 2nd coil  REF # [id number]  LOT # [id number]  Exp. 8/11/25    Penumbra Smart Coil 3 mm x 8 cm Ophthalmic Vein 3rd coil  REF # [id number]  LOT # [id number]  Exp. 7/1/23    Penumbra Smart Coil 4 mm x 10 cm Ophthalmic Vein 4th coil  REF # [id number]  LOT #   Exp. 7/19/234    Jamir 18 0.3 in the Right Ophthalmic Vein  REF# [id number]  Lot# [id number]  Exp. 8/31/23    Onxy 34 0.2 in the Right Ophthalmic Vein  REF# [id number]  Lot# [id number]  Exp. 11/4/23    Angioseal 6F Vascular Closure Device - Right groin  REF# [id number]  Lot# [id number]  Exp. 11/30/21.    1140: After procedure up in PACU patient is waking up more from anesthesia and moving all extremities on her own.

## 2021-03-03 NOTE — ANESTHESIA TIME REPORT
Anesthesia Start and Stop Event Times     Date Time Event    3/3/2021 0759 Ready for Procedure     0811 Anesthesia Start     1135 Anesthesia Stop        Responsible Staff  03/03/21    Name Role Begin End    Wood Mercado M.D. Anesth 0811 1135        Preop Diagnosis (Free Text):  Pre-op Diagnosis             Preop Diagnosis (Codes):    Post op Diagnosis  Carotid artery-cavernous sinus fistula      Premium Reason  Non-Premium    Comments:

## 2021-03-03 NOTE — ASSESSMENT & PLAN NOTE
Continue losartan  Furosemide on hold given poor intake and n.p.o. status resume postprocedure when tolerating diet

## 2021-03-03 NOTE — ANESTHESIA PREPROCEDURE EVALUATION
Relevant Problems   PULMONARY   (+) Asthma      CARDIAC   (+) Hypertension      ENDO   (+) Type 2 diabetes mellitus (HCC)       Physical Exam    Airway   Mallampati: II  TM distance: >3 FB  Neck ROM: full       Cardiovascular - normal exam  Rhythm: regular  Rate: normal  (-) murmur     Dental - normal exam           Pulmonary - normal exam  Breath sounds clear to auscultation     Abdominal    Neurological - normal exam                 Anesthesia Plan    ASA 3   ASA physical status 3 criteria: other (comment)    Plan - general       Airway plan will be LMA          Induction: intravenous    Postoperative Plan: Postoperative administration of opioids is intended.    Pertinent diagnostic labs and testing reviewed    Informed Consent:    Anesthetic plan and risks discussed with patient.    Use of blood products discussed with: patient whom consented to blood products.

## 2021-03-03 NOTE — H&P
Hospital Medicine History & Physical Note    Date of Service  3/3/2021    Primary Care Physician  Scout Alexis M.D.    Consultants  Interventional radiology  Critical care    Code Status  Full Code    Chief Complaint  Headache and low flow carotid cavernous fistula    History of Presenting Illness  70 y.o. female who presented 3/3/2021 with history of diabetes hypertension and left orbital pain since last summer.  She was evaluated by ophthalmology and had an extensive work-up and was diagnosed with a carotid cavernous fistula.  She underwent four-vessel angiogram in  January 2021 which revealed no flow cavernous fistula predominantly supplied by bilateral branches of the external carotid and internal carotid arteries.  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  Patient had been followed up by neuro ophthalmology and was evaluated by neuro interventional radiology and underwent elective vascular repair earlier this morning by Dr. Burnett.  Patient is complaining of mild headache no acute changes in vision no chest pain no back pain no focal weakness or numbness.    Review of Systems  Review of Systems   All other systems reviewed and are negative.      Past Medical History   has a past medical history of Arthritis, Asthma, Bowel habit changes, Diabetes (HCC), Diabetic neuropathy (HCC), Head ache, Hyperlipidemia, Hypertension, Migraine, Sleep apnea, and Snoring.    Surgical History   has a past surgical history that includes other; meniscus repair; and carpal tunnel release.     Family History  family history includes Diabetes in her father and mother; Hypertension in her father and mother; Parkinson's Disease in her father and mother.     Social History   reports that she quit smoking about 10 years ago. She has never used smokeless tobacco. She reports current alcohol use of about 0.6 oz of alcohol per week. She reports previous drug  use.    Allergies  Allergies   Allergen Reactions   • Clindamycin      Hives       Medications  Prior to Admission Medications   Prescriptions Last Dose Informant Patient Reported? Taking?   B Complex Vitamins (VITAMIN B COMPLEX PO) 2/26/2021 at 0700 Patient Yes No   Sig: Take 1 tablet by mouth every day.   BEPREVE 1.5 % Solution 3/1/2021 at Unknown time Patient Yes No   Sig: Administer 1 Drop into the left eye as needed.   Cinnamon 500 MG Cap 2/26/2021 at 0700 Patient Yes No   Sig: Take  by mouth.   Insulin Glargine, 2 Unit Dial, (TOUJEO MAX SOLOSTAR) 300 UNIT/ML Solution Pen-injector 3/1/2021 at 2100 Patient Yes No   Sig: Inject 38 Units under the skin every bedtime.   Misc Natural Products (GLUCOSAMINE CHOND CMP ADVANCED PO) 2/26/2021 at 0700 Patient Yes No   Sig: Take 1 tablet by mouth every day.   acetaZOLAMIDE (DIAMOX) 250 MG Tab 3/3/2021 at 0430 Patient Yes No   Sig: Take 250 mg by mouth 2 times a day.   aspirin EC (ECOTRIN) 81 MG Tablet Delayed Response 2/26/2021 at 0700 Patient Yes No   Sig: Take 81 mg by mouth every day.   atorvastatin (LIPITOR) 10 MG Tab 3/2/2021 at 2000 Patient Yes No   Sig: Take 10 mg by mouth every evening.   cyanocobalamin (VITAMIN B12) 1000 MCG Tab 2/26/2021 at 0700 Patient Yes No   Sig: Take 1,000 mcg by mouth every day.   diphenhydrAMINE (BENADRYL) 25 MG Tab 2/25/2021 at 2100 Patient Yes No   Sig: Take 25 mg by mouth at bedtime as needed for Sleep.   dorzolamide-timolol (COSOPT) 22.3-6.8 MG/ML Solution 3/3/2021 at 0430 Patient Yes No   Sig: Administer 1 Drop into both eyes 2 times a day.   furosemide (LASIX) 40 MG Tab 3/1/2021 at 0700 Patient Yes No   Sig: Take 20 mg by mouth every day.   insulin aspart (NOVOLOG) 100 UNIT/ML Solution 3/2/2021 at 1830 Patient Yes No   Sig: Inject 2-16 Units under the skin 3 times a day before meals. If BS  = 2 units  High 200=16 units   latanoprost (XALATAN) 0.005 % Solution 3/2/2021 at 2100 Patient No No   Sig: Administer 1 Drop into both  eyes every bedtime.   losartan (COZAAR) 100 MG Tab 3/1/2021 at 2100 Patient Yes No   Sig: Take 100 mg by mouth every evening.   metformin (GLUCOPHAGE) 1000 MG tablet 2/28/2021 at 0700 Patient Yes No   Sig: Take 1,000 mg by mouth 2 times a day, with meals.   montelukast (SINGULAIR) 10 MG Tab 3/1/2021 at 2100 Patient Yes No   Sig: Take 10 mg by mouth every bedtime.   naproxen (NAPROSYN) 250 MG Tab 2/26/2021 at 0700 Patient Yes No   Sig: Take 250 mg by mouth 1 time a day as needed.   therapeutic multivitamin-minerals (THERAGRAN-M) Tab 2/26/2021 at 0700 Patient Yes No   Sig: Take 1 Tab by mouth every day.   thiamine (THIAMINE) 100 MG tablet 2/26/2021 at 0700 Patient Yes No   Sig: Take 100 mg by mouth every day.   vitamin D (CHOLECALCIFEROL) 1000 Unit (25 mcg) Tab 2/26/2021 at 0700 Patient Yes No   Sig: Take 1,000 Units by mouth every day.      Facility-Administered Medications: None       Physical Exam  Temp:  [35.9 °C (96.6 °F)-36.7 °C (98 °F)] 35.9 °C (96.6 °F)  Pulse:  [72-82] 74  Resp:  [12-25] 25  BP: (127-151)/(77-84) 142/77  SpO2:  [95 %-100 %] 96 %    Physical Exam  Vitals and nursing note reviewed.   Constitutional:       General: She is not in acute distress.     Appearance: She is obese.   HENT:      Head: Normocephalic and atraumatic.      Nose: Nose normal.      Mouth/Throat:      Pharynx: No oropharyngeal exudate or posterior oropharyngeal erythema.   Eyes:      General:         Right eye: No discharge.         Left eye: No discharge.      Comments: Mild anisocoria baseline per RN   Cardiovascular:      Rate and Rhythm: Normal rate and regular rhythm.      Heart sounds: No murmur. No friction rub. No gallop.    Pulmonary:      Effort: Pulmonary effort is normal. No respiratory distress.      Breath sounds: No stridor. No rhonchi or rales.   Chest:      Chest wall: No tenderness.   Abdominal:      General: Bowel sounds are normal. There is no distension.      Palpations: Abdomen is soft. There is no mass.       Tenderness: There is no abdominal tenderness. There is no guarding.   Musculoskeletal:         General: No swelling or tenderness.      Cervical back: Neck supple.      Comments: Bilateral groin sites are soft with no signs of active bleeding   Skin:     General: Skin is warm and dry.      Coloration: Skin is not cyanotic.      Nails: There is no clubbing.   Neurological:      General: No focal deficit present.      Mental Status: She is alert and oriented to person, place, and time.      Cranial Nerves: No cranial nerve deficit.      Motor: No weakness.   Psychiatric:         Mood and Affect: Mood normal.         Behavior: Behavior normal.         Laboratory:          No results for input(s): ALTSGPT, ASTSGOT, ALKPHOSPHAT, TBILIRUBIN, DBILIRUBIN, GAMMAGT, AMYLASE, LIPASE, ALB, PREALBUMIN, GLUCOSE in the last 72 hours.      No results for input(s): NTPROBNP in the last 72 hours.      No results for input(s): TROPONINT in the last 72 hours.    Imaging:  IR-EMBOLIZE-NEURO-EXTRACRANIAL    (Results Pending)         Assessment/Plan:  I anticipate this patient will require at least two midnights for appropriate medical management, necessitating inpatient admission.    Carotid-cavernous fistula- (present on admission)  Assessment & Plan  Status post endovascular repair    Patient will be admitted to the ICU for close clinical monitoring with serial neurologic exams every hour  Dr. Burnett recommended keeping SBP less than 140 and no anticoagulation or antiplatelets  Patient's SBP is currently 140 we will give her 1 dose of IV labetalol 10 mg and resume home BP meds and monitor blood pressure and adjust accordingly    Type 2 diabetes mellitus (HCC)- (present on admission)  Assessment & Plan  With hyperglycemia glucose 197    We will hold Metformin at this time  Continue home dose of insulin glargine we will start her on sliding scale insulin  Monitor CBGs and adjust accordingly    Hyperlipidemia- (present on  admission)  Assessment & Plan  Continue atorvastatin    Hypertension- (present on admission)  Assessment & Plan  Resume home dose of losartan and furosemide  Goal SBP less than 140  As needed IV labetalol and hydralazine    Asthma- (present on admission)  Assessment & Plan  No acute exacerbation    Continue Singulair and bronchodilators as needed

## 2021-03-03 NOTE — ANESTHESIA POSTPROCEDURE EVALUATION
Patient: Darnell Kirkland    Procedure Summary     Date: 03/03/21 Room / Location: [facility] IMAGING - INTERVENTIONAL - [facility]    Anesthesia Start: 0811 Anesthesia Stop:     Procedure: IR-EMBOLIZE-NEURO-EXTRACRANIAL Diagnosis:       Carotid artery-cavernous sinus fistula      (Vascular malformation)      (angiogram and embolization of LEFT CCF)    Scheduled Providers: Rojelio Burnett M.D. Responsible Provider: Wood Mercado M.D.    Anesthesia Type: general ASA Status: 3          Final Anesthesia Type: general  Last vitals  BP   Blood Pressure : 151/84    Temp   36.7 °C (98 °F)    Pulse   73   Resp   18    SpO2   95 %      Anesthesia Post Evaluation    Patient location during evaluation: PACU  Patient participation: complete - patient participated  Level of consciousness: awake and alert  Pain score: 0    Airway patency: patent  Anesthetic complications: no  Cardiovascular status: hemodynamically stable  Respiratory status: acceptable  Hydration status: euvolemic    PONV: none          No complications documented.     Nurse Pain Score: 0 (NPRS)

## 2021-03-03 NOTE — ASSESSMENT & PLAN NOTE
With hyperglycemia    Metformin on hold  Continue Lantus and sliding scale insulin  Monitor glucose and adjust insulin accordingly with changes in steroid dose      Recent Labs     03/03/21  1253 03/03/21  1646 03/03/21  2103 03/04/21  0703 03/04/21  1056 03/04/21  1759 03/04/21  2053 03/05/21  0910   POCGLUCOSE 146* 113* 164* 176* 178* 193* 208* 160*

## 2021-03-03 NOTE — ASSESSMENT & PLAN NOTE
Status post endovascular repair    Patient with left vision changes and persistent edema discussed with neuro interventional radiology plan today is to proceed with complete chain of endovascular repair of carotid-cavernous fistula

## 2021-03-03 NOTE — PROGRESS NOTES
Med rec complete per pt at bedside  Interviewed pt with family at bedside with permission from pt  Allergies reviewed and updated.   Pt unsure of Sliding Scale . Per pt doctor just changed scale.  Allergies reviewed

## 2021-03-03 NOTE — OR SURGEON
Immediate Post- Operative Note        PostOp Diagnosis: CCF    Procedure(s): Endovascualar repair       Estimated Blood Loss: Less than 5 ml        Complications: None            3/3/2021     11:20 AM     Rojelio Burnett M.D.

## 2021-03-03 NOTE — ANESTHESIA PROCEDURE NOTES
Airway    Date/Time: 3/3/2021 8:11 AM  Performed by: Wood Mercado M.D.  Authorized by: Wood Mercado M.D.     Location:  OR  Urgency:  Elective  Indications for Airway Management:  Anesthesia      Spontaneous Ventilation: absent    Sedation Level:  Deep  Preoxygenated: Yes    Final Airway Type:  Supraglottic airway  Final Supraglottic Airway:  Standard LMA    SGA Size:  4  Number of Attempts at Approach:  1

## 2021-03-04 ENCOUNTER — APPOINTMENT (OUTPATIENT)
Dept: RADIOLOGY | Facility: MEDICAL CENTER | Age: 71
DRG: 271 | End: 2021-03-04
Attending: HOSPITALIST
Payer: MEDICARE

## 2021-03-04 ENCOUNTER — APPOINTMENT (OUTPATIENT)
Dept: RADIOLOGY | Facility: MEDICAL CENTER | Age: 71
DRG: 271 | End: 2021-03-04
Attending: NURSE PRACTITIONER
Payer: MEDICARE

## 2021-03-04 PROBLEM — R11.0 NAUSEA: Status: ACTIVE | Noted: 2021-03-04

## 2021-03-04 LAB
ANION GAP SERPL CALC-SCNC: 10 MMOL/L (ref 7–16)
BASOPHILS # BLD AUTO: 0.5 % (ref 0–1.8)
BASOPHILS # BLD: 0.04 K/UL (ref 0–0.12)
BUN SERPL-MCNC: 17 MG/DL (ref 8–22)
CALCIUM SERPL-MCNC: 9.3 MG/DL (ref 8.5–10.5)
CHLORIDE SERPL-SCNC: 108 MMOL/L (ref 96–112)
CO2 SERPL-SCNC: 21 MMOL/L (ref 20–33)
CREAT SERPL-MCNC: 0.66 MG/DL (ref 0.5–1.4)
EOSINOPHIL # BLD AUTO: 0.01 K/UL (ref 0–0.51)
EOSINOPHIL NFR BLD: 0.1 % (ref 0–6.9)
ERYTHROCYTE [DISTWIDTH] IN BLOOD BY AUTOMATED COUNT: 47.2 FL (ref 35.9–50)
GLUCOSE BLD-MCNC: 176 MG/DL (ref 65–99)
GLUCOSE BLD-MCNC: 178 MG/DL (ref 65–99)
GLUCOSE BLD-MCNC: 193 MG/DL (ref 65–99)
GLUCOSE BLD-MCNC: 208 MG/DL (ref 65–99)
GLUCOSE SERPL-MCNC: 196 MG/DL (ref 65–99)
HCT VFR BLD AUTO: 39.1 % (ref 37–47)
HGB BLD-MCNC: 13 G/DL (ref 12–16)
IMM GRANULOCYTES # BLD AUTO: 0.04 K/UL (ref 0–0.11)
IMM GRANULOCYTES NFR BLD AUTO: 0.5 % (ref 0–0.9)
LYMPHOCYTES # BLD AUTO: 1.37 K/UL (ref 1–4.8)
LYMPHOCYTES NFR BLD: 15.7 % (ref 22–41)
MCH RBC QN AUTO: 31.9 PG (ref 27–33)
MCHC RBC AUTO-ENTMCNC: 33.2 G/DL (ref 33.6–35)
MCV RBC AUTO: 95.8 FL (ref 81.4–97.8)
MONOCYTES # BLD AUTO: 0.51 K/UL (ref 0–0.85)
MONOCYTES NFR BLD AUTO: 5.9 % (ref 0–13.4)
NEUTROPHILS # BLD AUTO: 6.74 K/UL (ref 2–7.15)
NEUTROPHILS NFR BLD: 77.3 % (ref 44–72)
NRBC # BLD AUTO: 0 K/UL
NRBC BLD-RTO: 0 /100 WBC
PLATELET # BLD AUTO: 188 K/UL (ref 164–446)
PMV BLD AUTO: 10.7 FL (ref 9–12.9)
POTASSIUM SERPL-SCNC: 4.1 MMOL/L (ref 3.6–5.5)
RBC # BLD AUTO: 4.08 M/UL (ref 4.2–5.4)
SODIUM SERPL-SCNC: 139 MMOL/L (ref 135–145)
WBC # BLD AUTO: 8.7 K/UL (ref 4.8–10.8)

## 2021-03-04 PROCEDURE — 99232 SBSQ HOSP IP/OBS MODERATE 35: CPT | Performed by: HOSPITALIST

## 2021-03-04 PROCEDURE — 80048 BASIC METABOLIC PNL TOTAL CA: CPT

## 2021-03-04 PROCEDURE — 70543 MRI ORBT/FAC/NCK W/O &W/DYE: CPT | Mod: MG

## 2021-03-04 PROCEDURE — 700105 HCHG RX REV CODE 258: Performed by: HOSPITALIST

## 2021-03-04 PROCEDURE — 85025 COMPLETE CBC W/AUTO DIFF WBC: CPT

## 2021-03-04 PROCEDURE — A9270 NON-COVERED ITEM OR SERVICE: HCPCS | Performed by: HOSPITALIST

## 2021-03-04 PROCEDURE — 700102 HCHG RX REV CODE 250 W/ 637 OVERRIDE(OP): Performed by: HOSPITALIST

## 2021-03-04 PROCEDURE — 82962 GLUCOSE BLOOD TEST: CPT

## 2021-03-04 PROCEDURE — 770006 HCHG ROOM/CARE - MED/SURG/GYN SEMI*

## 2021-03-04 PROCEDURE — 700111 HCHG RX REV CODE 636 W/ 250 OVERRIDE (IP): Performed by: NURSE PRACTITIONER

## 2021-03-04 PROCEDURE — 99291 CRITICAL CARE FIRST HOUR: CPT | Performed by: NURSE PRACTITIONER

## 2021-03-04 PROCEDURE — 700111 HCHG RX REV CODE 636 W/ 250 OVERRIDE (IP): Performed by: HOSPITALIST

## 2021-03-04 PROCEDURE — A9576 INJ PROHANCE MULTIPACK: HCPCS | Performed by: NURSE PRACTITIONER

## 2021-03-04 PROCEDURE — 700117 HCHG RX CONTRAST REV CODE 255: Performed by: NURSE PRACTITIONER

## 2021-03-04 RX ORDER — DEXAMETHASONE SODIUM PHOSPHATE 4 MG/ML
4 INJECTION, SOLUTION INTRA-ARTICULAR; INTRALESIONAL; INTRAMUSCULAR; INTRAVENOUS; SOFT TISSUE EVERY 6 HOURS
Status: COMPLETED | OUTPATIENT
Start: 2021-03-04 | End: 2021-03-04

## 2021-03-04 RX ORDER — METOCLOPRAMIDE HYDROCHLORIDE 5 MG/ML
10 INJECTION INTRAMUSCULAR; INTRAVENOUS EVERY 6 HOURS
Status: DISCONTINUED | OUTPATIENT
Start: 2021-03-04 | End: 2021-03-04

## 2021-03-04 RX ORDER — NYSTATIN 100000 [USP'U]/G
POWDER TOPICAL 2 TIMES DAILY
Status: DISCONTINUED | OUTPATIENT
Start: 2021-03-04 | End: 2021-03-06 | Stop reason: HOSPADM

## 2021-03-04 RX ORDER — DEXTROSE MONOHYDRATE 25 G/50ML
50 INJECTION, SOLUTION INTRAVENOUS
Status: DISCONTINUED | OUTPATIENT
Start: 2021-03-04 | End: 2021-03-06 | Stop reason: HOSPADM

## 2021-03-04 RX ORDER — METOCLOPRAMIDE HYDROCHLORIDE 5 MG/ML
10 INJECTION INTRAMUSCULAR; INTRAVENOUS EVERY 6 HOURS PRN
Status: DISCONTINUED | OUTPATIENT
Start: 2021-03-04 | End: 2021-03-06 | Stop reason: HOSPADM

## 2021-03-04 RX ORDER — SODIUM CHLORIDE 9 MG/ML
INJECTION, SOLUTION INTRAVENOUS CONTINUOUS
Status: DISCONTINUED | OUTPATIENT
Start: 2021-03-04 | End: 2021-03-06

## 2021-03-04 RX ADMIN — ONDANSETRON 4 MG: 2 INJECTION INTRAMUSCULAR; INTRAVENOUS at 00:26

## 2021-03-04 RX ADMIN — DOCUSATE SODIUM 50 MG AND SENNOSIDES 8.6 MG 2 TABLET: 8.6; 5 TABLET, FILM COATED ORAL at 17:10

## 2021-03-04 RX ADMIN — ONDANSETRON 4 MG: 2 INJECTION INTRAMUSCULAR; INTRAVENOUS at 04:49

## 2021-03-04 RX ADMIN — INSULIN HUMAN 2 UNITS: 100 INJECTION, SOLUTION PARENTERAL at 11:03

## 2021-03-04 RX ADMIN — LOSARTAN POTASSIUM 100 MG: 50 TABLET, FILM COATED ORAL at 17:10

## 2021-03-04 RX ADMIN — ATORVASTATIN CALCIUM 10 MG: 10 TABLET, FILM COATED ORAL at 17:10

## 2021-03-04 RX ADMIN — ONDANSETRON 4 MG: 4 TABLET, ORALLY DISINTEGRATING ORAL at 18:00

## 2021-03-04 RX ADMIN — SODIUM CHLORIDE: 9 INJECTION, SOLUTION INTRAVENOUS at 08:15

## 2021-03-04 RX ADMIN — DEXAMETHASONE SODIUM PHOSPHATE 4 MG: 4 INJECTION, SOLUTION INTRA-ARTICULAR; INTRALESIONAL; INTRAMUSCULAR; INTRAVENOUS; SOFT TISSUE at 08:18

## 2021-03-04 RX ADMIN — ACETAZOLAMIDE 250 MG: 250 TABLET ORAL at 18:00

## 2021-03-04 RX ADMIN — SODIUM CHLORIDE: 9 INJECTION, SOLUTION INTRAVENOUS at 22:30

## 2021-03-04 RX ADMIN — HYDROMORPHONE HYDROCHLORIDE 0.25 MG: 1 INJECTION, SOLUTION INTRAMUSCULAR; INTRAVENOUS; SUBCUTANEOUS at 00:17

## 2021-03-04 RX ADMIN — HYDRALAZINE HYDROCHLORIDE 20 MG: 20 INJECTION INTRAMUSCULAR; INTRAVENOUS at 10:58

## 2021-03-04 RX ADMIN — INSULIN GLARGINE 30 UNITS: 100 INJECTION, SOLUTION SUBCUTANEOUS at 21:53

## 2021-03-04 RX ADMIN — DEXAMETHASONE SODIUM PHOSPHATE 4 MG: 4 INJECTION, SOLUTION INTRA-ARTICULAR; INTRALESIONAL; INTRAMUSCULAR; INTRAVENOUS; SOFT TISSUE at 23:15

## 2021-03-04 RX ADMIN — ACETAMINOPHEN 650 MG: 325 TABLET, FILM COATED ORAL at 18:00

## 2021-03-04 RX ADMIN — HYDROMORPHONE HYDROCHLORIDE 0.25 MG: 1 INJECTION, SOLUTION INTRAMUSCULAR; INTRAVENOUS; SUBCUTANEOUS at 02:04

## 2021-03-04 RX ADMIN — MONTELUKAST 10 MG: 10 TABLET, FILM COATED ORAL at 20:54

## 2021-03-04 RX ADMIN — ACETAMINOPHEN 650 MG: 325 TABLET, FILM COATED ORAL at 10:43

## 2021-03-04 RX ADMIN — DORZOLAMIDE HYDROCHLORIDE AND TIMOLOL MALEATE 1 DROP: 20; 5 SOLUTION/ DROPS OPHTHALMIC at 18:00

## 2021-03-04 RX ADMIN — DEXAMETHASONE SODIUM PHOSPHATE 4 MG: 4 INJECTION, SOLUTION INTRA-ARTICULAR; INTRALESIONAL; INTRAMUSCULAR; INTRAVENOUS; SOFT TISSUE at 13:31

## 2021-03-04 RX ADMIN — DEXAMETHASONE SODIUM PHOSPHATE 4 MG: 4 INJECTION, SOLUTION INTRA-ARTICULAR; INTRALESIONAL; INTRAMUSCULAR; INTRAVENOUS; SOFT TISSUE at 17:09

## 2021-03-04 RX ADMIN — ONDANSETRON 4 MG: 2 INJECTION INTRAMUSCULAR; INTRAVENOUS at 10:43

## 2021-03-04 RX ADMIN — INSULIN HUMAN 2 UNITS: 100 INJECTION, SOLUTION PARENTERAL at 07:06

## 2021-03-04 RX ADMIN — GADOTERIDOL 20 ML: 279.3 INJECTION, SOLUTION INTRAVENOUS at 11:46

## 2021-03-04 RX ADMIN — METOCLOPRAMIDE 10 MG: 5 INJECTION, SOLUTION INTRAMUSCULAR; INTRAVENOUS at 07:51

## 2021-03-04 RX ADMIN — INSULIN HUMAN 3 UNITS: 100 INJECTION, SOLUTION PARENTERAL at 18:04

## 2021-03-04 RX ADMIN — NYSTATIN: 100000 POWDER TOPICAL at 17:09

## 2021-03-04 RX ADMIN — DORZOLAMIDE HYDROCHLORIDE AND TIMOLOL MALEATE 1 DROP: 20; 5 SOLUTION/ DROPS OPHTHALMIC at 06:05

## 2021-03-04 RX ADMIN — INSULIN HUMAN 4 UNITS: 100 INJECTION, SOLUTION PARENTERAL at 20:55

## 2021-03-04 ASSESSMENT — ENCOUNTER SYMPTOMS
VOMITING: 1
CHILLS: 0
CONSTIPATION: 0
VOMITING: 0
EYE REDNESS: 1
DIZZINESS: 0
PALPITATIONS: 0
DIARRHEA: 0
SPEECH CHANGE: 0
NAUSEA: 1
NERVOUS/ANXIOUS: 0
WHEEZING: 0
ABDOMINAL PAIN: 0
BLURRED VISION: 1
FEVER: 0
MYALGIAS: 0
COUGH: 0
SHORTNESS OF BREATH: 0
FALLS: 0
PHOTOPHOBIA: 1
EYE PAIN: 1
HEADACHES: 1
FOCAL WEAKNESS: 0
INSOMNIA: 0
SENSORY CHANGE: 0

## 2021-03-04 ASSESSMENT — PAIN DESCRIPTION - PAIN TYPE
TYPE: ACUTE PAIN

## 2021-03-04 NOTE — ASSESSMENT & PLAN NOTE
-Holding home metformin while hospitalized  -Accu-Cheks AC at bedtime with SSI  -Continue home dose of Lantus  -Diabetic diet

## 2021-03-04 NOTE — ASSESSMENT & PLAN NOTE
-Post procedure day #1 endovascular repair  -She has been receiving both IV and p.o. narcotics for headaches.  Stop IV narcotics.  Use Tylenol for headaches  -Zofran and Reglan  -Discussed with IR.  Start course of Decadron and get MRI orbits

## 2021-03-04 NOTE — ASSESSMENT & PLAN NOTE
-Post procedure day 1 endovascular repair  -Nausea and headaches.  MRI per IR recommendations  -She reports improvement in left eye vision loss  -Keep SBP <140 with as needed IV antihypertensives  -Anticoagulation per IR team

## 2021-03-04 NOTE — CARE PLAN
Problem: Pain Management  Goal: Pain level will decrease to patient's comfort goal  Outcome: PROGRESSING AS EXPECTED  Intervention: Follow pain managment plan developed in collaboration with patient and Interdisciplinary Team  Note: Patient receiving PRN pain medication for headache, will continue to monitor and treat as needed     Problem: Skin Integrity  Goal: Risk for impaired skin integrity will decrease  Outcome: PROGRESSING AS EXPECTED  Intervention: Implement precautions to protect skin integrity in collaboration with the interdisciplinary team  Flowsheets  Taken 3/3/2021 2135  Containment Devices: Indwelling Catheter  Protocols: Pressure Ulcer Prevention / Intervention Protocol in Place  Taken 3/3/2021 2000  Skin Preventative Measures:   Pillows in Use for Support / Positioning   Silicone Oxygen Tubing in Use  Bed Types: Low Air Loss Mattress  Friction Interventions: Draw Sheet / Pad Used for Repositioning

## 2021-03-04 NOTE — PROGRESS NOTES
Patient evaluated with Dr. Burnett. Vision remains intact. Pain is less than this am, now 2-3/10. Daughter at bedside. LEFT eye CN VI palsy now pronounced and more edematous. Rt eye with less edema than this am and daughter thinks it is mildly improved from pre procedure 3/3.      Will evaluate pt in the am. If symptoms are not markedly improved, will complete the embolization of the remaining left CCF. Daughter and care team updated. Anticipate procedure to be late morning 3/5.

## 2021-03-04 NOTE — PROGRESS NOTES
Skin check completed with 2nd RN. Redness noted under Left breast- inquired about nystatin from MD. Bilateral groin sites notes, soft & covered with gauze, CDI. No other skin issues found.

## 2021-03-04 NOTE — PROGRESS NOTES
Worsening vision s/p incomplete bilateral CCF embolization 3/3/21. Lost vision last night, now with all visual fields intact but blurred/ double vision reported. C/o severe rt orbital pain and vomiting, unable to keep food/fluids down.     After discussion w/Angeles Burnett and Isa Wiseman, will obtain stat MRI orbits to evaluate congestion post procedure. Steroids may be of benefit, trial course initiated. Pending results of MRI, may transition to neuro dorsey.

## 2021-03-04 NOTE — PROGRESS NOTES
Hospital Medicine Daily Progress Note    Date of Service  3/4/2021    Chief Complaint  70 y.o. female admitted 3/3/2021 with headache    Hospital Course    70-year-old female with history of diabetes hypertension and headache and left arm swelling and pain secondary to low flow carotid cavernous fistula admitted on 3/3/2021 and underwent embolization by neuro interventional radiology    Interval Problem Update    Complains of moderate headache  Reports having blurred vision in the left eye improved this morning compared to last night no worse than prior to admission palpation  Received narcotics overnight for headache  Complains of nausea and vomiting with poor intake  Afebrile    Consultants/Specialty  Neuro interventional radiology  Critical care    Code Status  Full Code    Disposition  Neurology floor    Review of Systems  Review of Systems   Constitutional: Negative for chills and fever.   Eyes: Positive for blurred vision.   Cardiovascular: Negative for chest pain and palpitations.   Gastrointestinal: Positive for nausea and vomiting.   Neurological: Positive for headaches. Negative for sensory change, speech change and focal weakness.   All other systems reviewed and are negative.       Physical Exam  Temp:  [35.9 °C (96.7 °F)-36.5 °C (97.7 °F)] 36.2 °C (97.1 °F)  Pulse:  [50-72] 61  Resp:  [11-28] 15  BP: (126-165)/(60-81) 165/81  SpO2:  [95 %-98 %] 97 %    Physical Exam  Vitals and nursing note reviewed.   Constitutional:       General: She is not in acute distress.  HENT:      Head: Normocephalic and atraumatic.      Nose: Nose normal. No rhinorrhea.      Mouth/Throat:      Pharynx: No oropharyngeal exudate or posterior oropharyngeal erythema.   Eyes:      General:         Right eye: No discharge.         Left eye: No discharge.      Comments: Mild anisocoria  Conjunctival injection edema left>Right   Cardiovascular:      Rate and Rhythm: Normal rate and regular rhythm.      Heart sounds: Normal heart  sounds. No murmur. No friction rub. No gallop.    Pulmonary:      Effort: Pulmonary effort is normal. No respiratory distress.      Breath sounds: Normal breath sounds. No stridor. No wheezing, rhonchi or rales.   Chest:      Chest wall: No tenderness.   Abdominal:      General: Bowel sounds are normal. There is no distension.      Palpations: Abdomen is soft. There is no mass.      Tenderness: There is no abdominal tenderness. There is no rebound.   Musculoskeletal:         General: No swelling or tenderness.      Cervical back: Neck supple. No rigidity.   Skin:     General: Skin is warm and dry.      Coloration: Skin is not cyanotic or jaundiced.      Nails: There is no clubbing.   Neurological:      General: No focal deficit present.      Mental Status: She is alert and oriented to person, place, and time.      Cranial Nerves: No cranial nerve deficit.      Motor: No weakness.   Psychiatric:         Mood and Affect: Mood normal.         Behavior: Behavior normal.         Fluids    Intake/Output Summary (Last 24 hours) at 3/4/2021 1301  Last data filed at 3/4/2021 1200  Gross per 24 hour   Intake 630 ml   Output 1935 ml   Net -1305 ml       Laboratory  Recent Labs     03/04/21  0400   WBC 8.7   RBC 4.08*   HEMOGLOBIN 13.0   HEMATOCRIT 39.1   MCV 95.8   MCH 31.9   MCHC 33.2*   RDW 47.2   PLATELETCT 188   MPV 10.7     Recent Labs     03/04/21  0400   SODIUM 139   POTASSIUM 4.1   CHLORIDE 108   CO2 21   GLUCOSE 196*   BUN 17   CREATININE 0.66   CALCIUM 9.3                   Imaging  MR-ORBITS,FACE,NECK-WITH&W/O & SEQUENCES   Final Result      1.  There are changes secondary to the endovascular repair of carotid cavernous fistula. There are abnormal left cavernous sinus flow voids consistent with residual left-sided CC fistula. Please note the left-sided carotid-cavernous fistula was not    repaired completely.   2.  There is edematous bilateral extraocular muscles. This is unchanged since the previous MRI dated  1/26/2021.   3.  Mild cerebral volume loss.   4.  Mild chronic microvascular ischemic disease.      IR-EMBOLIZE-NEURO-EXTRACRANIAL    (Results Pending)        Assessment/Plan  Carotid-cavernous fistula- (present on admission)  Assessment & Plan  Status post endovascular repair    Given visual changes and headache discussed with neuro interventional radiology recommendation is to proceed with MRI of the orbits and start patient on Decadron  Continue close clinical monitoring with every 4 hours neurochecks      Type 2 diabetes mellitus (HCC)- (present on admission)  Assessment & Plan  With hyperglycemia glucose 197    Continue to hold Metformin  Continue Lantus  Increase to high sliding scale insulin  Monitor CBGs with steroids      Recent Labs     03/03/21  0700 03/03/21  1253 03/03/21  1646 03/03/21  2103 03/04/21  0703 03/04/21  1056   POCGLUCOSE 197* 146* 113* 164* 176* 178*        Nausea  Assessment & Plan  Possibly related to narcotics  Antiemetics  IV hydration  Follow-up on MRI    Hyperlipidemia- (present on admission)  Assessment & Plan  Continue atorvastatin    Hypertension- (present on admission)  Assessment & Plan  Continue losartan  Hold furosemide given nausea vomiting  Monitor blood pressure  As needed hydralazine and labetalol    Asthma- (present on admission)  Assessment & Plan  No acute exacerbation    Continue Singulair and bronchodilators as needed       Addendum:  Discussed with Dr. Guillermo MRI of orbits is stable with edematous extraocular muscles  He recommends to continue steroids and clinical monitoring    VTE prophylaxis: SCD

## 2021-03-04 NOTE — ASSESSMENT & PLAN NOTE
-Goal SBP <140  -Resume her home furosemide and acetazolamide  -As needed hydralazine and labetalol with parameters

## 2021-03-04 NOTE — PROGRESS NOTES
Critical Care Progress Note    Date of admission  3/3/2021    Chief Complaint  Left eye vision loss    Hospital Course  Ms. Kirkland is a 70-year-old female with PMH significant for HTN, DM 2, asthma, and HLD who presented 3/3/2021 for elective IR procedure.  She has been having left orbital pain since last summer and has been evaluated by ophthalmology and diagnosed with carotid cavernous fistula.  Four-vessel angiogram done January 2021 showed no flow cavernous fistula predominantly supplied by bilateral branches of the external carotid and internal carotid arteries.  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  She underwent elective vascular repair earlier this morning by Dr. Burnett.  She was transferred to ICU for every hour neuro checks.  Goal SBP <140.  No blood thinners until cleared by Dr. Burnett.     Interval Problem Update  Reviewed last 24 hour events:              - acute events overnight: Left eye vision is improving per patient.  Ongoing nausea requiring every 4 hours Zofran.  Adding as needed Reglan.              - Tm: 97.1              - HR: 50s to 60s sinus bradycardia/sinus rhythm              - SBP: 140s to 160s -received 1 dose of IV hydralazine              - Neuro: A&O x4.  Moves all extremities.  Ongoing vision loss left eye, improving.              - GI: Nausea.  Not wanting to eat/drink              - I/O: 1600/2600 (-1000 since admit)               - Pritchard: yes - DC today              - Lines: none              - PPx: not indicated              - CXR (personally reviewed): none today              - Antibiotic Day: None   - SAT/SBT: not indicated   - Mobility: 3B   - Goals of care: Stat MRI orbits.  May transfer to neuro floor pending results.    Review of Systems  Review of Systems   Constitutional: Negative for chills and fever.   HENT: Negative.    Eyes: Positive for photophobia, pain and redness.        Subconjunctival hemorrhage bilaterally at  baseline    Left eye vision loss - improving     Respiratory: Negative for cough, shortness of breath and wheezing.    Cardiovascular: Negative for chest pain, palpitations and leg swelling.   Gastrointestinal: Positive for nausea. Negative for abdominal pain, constipation, diarrhea and vomiting.   Genitourinary: Negative for dysuria and urgency.   Musculoskeletal: Negative for falls, joint pain and myalgias.   Neurological: Positive for headaches (off and on - improving). Negative for dizziness and focal weakness.   Psychiatric/Behavioral: The patient is not nervous/anxious and does not have insomnia.    All other systems reviewed and are negative.       Vital Signs for last 24 hours   Temp:  [35.9 °C (96.7 °F)-36.5 °C (97.7 °F)] 36.2 °C (97.1 °F)  Pulse:  [50-82] 61  Resp:  [11-28] 15  BP: (124-165)/(60-82) 165/81  SpO2:  [95 %-98 %] 97 %    Hemodynamic parameters for last 24 hours       Respiratory Information for the last 24 hours       Physical Exam   Physical Exam  Vitals and nursing note reviewed. Exam conducted with a chaperone present.   Constitutional:       General: She is sleeping. She is not in acute distress.     Appearance: She is obese. She is not ill-appearing.   HENT:      Head: Normocephalic.      Right Ear: Hearing normal.      Left Ear: Hearing normal.      Nose: Nose normal.      Mouth/Throat:      Lips: Pink.      Mouth: Mucous membranes are moist.      Pharynx: Oropharynx is clear.   Eyes:      Conjunctiva/sclera:      Right eye: Hemorrhage present.      Left eye: Hemorrhage present.      Comments: Bilateral periorbital swelling  Bilateral subconjunctival hemorrhage  Left sided visual cut    Cardiovascular:      Rate and Rhythm: Normal rate.      Pulses: Normal pulses.      Heart sounds: Normal heart sounds.      Comments: Right femoral arterial access sites without hematoma  Pulmonary:      Effort: Pulmonary effort is normal.      Breath sounds: Normal breath sounds. No wheezing.    Abdominal:      General: Bowel sounds are normal.      Palpations: Abdomen is soft.      Tenderness: There is no abdominal tenderness.   Musculoskeletal:      Right lower leg: No edema.      Left lower leg: No edema.      Comments: Moves all extremities equally 5/5   Skin:     General: Skin is warm and dry.      Capillary Refill: Capillary refill takes less than 2 seconds.   Neurological:      General: No focal deficit present.      Mental Status: She is oriented to person, place, and time and easily aroused.      Sensory: Sensation is intact.      Motor: Motor function is intact.      Coordination: Coordination is intact.   Psychiatric:         Attention and Perception: Attention normal.         Mood and Affect: Mood normal.         Behavior: Behavior is cooperative.         Cognition and Memory: Cognition normal.         Judgment: Judgment normal.         Medications  Current Facility-Administered Medications   Medication Dose Route Frequency Provider Last Rate Last Admin   • NS infusion   Intravenous Continuous Mike Michelle M.D. 75 mL/hr at 03/04/21 0815 New Bag at 03/04/21 0815   • dexamethasone (DECADRON) injection 4 mg  4 mg Intravenous Q6HRS Ping Noguera, A.P.N.   4 mg at 03/04/21 0818   • metoclopramide (REGLAN) injection 10 mg  10 mg Intravenous Q6HRS PRN Su Gonzalez, A.P.R.N.       • labetalol (NORMODYNE/TRANDATE) injection 10 mg  10 mg Intravenous Once Mike Michelle M.D.   Stopped at 03/03/21 1300   • acetaZOLAMIDE (DIAMOX) tablet 250 mg  250 mg Oral BID Mike Michelle M.D.   Stopped at 03/04/21 0604   • atorvastatin (LIPITOR) tablet 10 mg  10 mg Oral Q EVENING Mike Michelle M.D.   10 mg at 03/03/21 1712   • dorzolamide-timolol (COSOPT) 22.3-6.8 MG/ML ophthalmic drops 1 Drop  1 Drop Both Eyes BID Mike Michelle M.D.   1 Drop at 03/04/21 0605   • furosemide (LASIX) tablet 20 mg  20 mg Oral DAILY Mike Michelle M.D.   Stopped at 03/04/21 0600   • insulin  glargine (Lantus) injection  30 Units Subcutaneous QHS Mike Michelle M.D.   30 Units at 03/03/21 2108   • losartan (COZAAR) tablet 100 mg  100 mg Oral Q EVENING Mike Michelle M.D.   100 mg at 03/03/21 1712   • montelukast (SINGULAIR) tablet 10 mg  10 mg Oral QHS Mike Michelle M.D.   10 mg at 03/03/21 2013   • thiamine (Vitamin B-1) tablet 100 mg  100 mg Oral DAILY Mike Michelle M.D.   Stopped at 03/04/21 0600   • cyanocobalamin (VITAMIN B-12) tablet 1,000 mcg  1,000 mcg Oral DAILY Mike Michelle M.D.   Stopped at 03/04/21 0600   • senna-docusate (PERICOLACE or SENOKOT S) 8.6-50 MG per tablet 2 tablet  2 tablet Oral BID Mike Michelle M.D.   Stopped at 03/04/21 0600    And   • polyethylene glycol/lytes (MIRALAX) PACKET 1 Packet  1 Packet Oral QDAY PRN Mike Michelle M.D.        And   • magnesium hydroxide (MILK OF MAGNESIA) suspension 30 mL  30 mL Oral QDAY PRN Mike Michelle M.D.        And   • bisacodyl (DULCOLAX) suppository 10 mg  10 mg Rectal QDAY PRN Mike Michelle M.D.       • labetalol (NORMODYNE/TRANDATE) injection 10-20 mg  10-20 mg Intravenous Q4HRS PRN Mike Michelle M.D.       • ondansetron (ZOFRAN) syringe/vial injection 4 mg  4 mg Intravenous Q4HRS PRN Mike Michelle M.D.   4 mg at 03/04/21 1043   • ondansetron (ZOFRAN ODT) dispertab 4 mg  4 mg Oral Q4HRS PRN Mkie Michelle M.D.       • acetaminophen (Tylenol) tablet 650 mg  650 mg Oral Q6HRS PRN Mike Michelle M.D.   650 mg at 03/04/21 1043   • Pharmacy Consult Request ...Pain Management Review 1 Each  1 Each Other PHARMACY TO DOSE Mike Michelle M.D.       • oxyCODONE immediate-release (ROXICODONE) tablet 2.5 mg  2.5 mg Oral Q3HRS PRN Mike Michelle M.D.   2.5 mg at 03/03/21 2013    Or   • oxyCODONE immediate-release (ROXICODONE) tablet 5 mg  5 mg Oral Q3HRS PRN Mike Michelle M.D.   5 mg at 03/03/21 2317    Or   • HYDROmorphone pf (DILAUDID) injection  0.25 mg  0.25 mg Intravenous Q3HRS PRN Mike Michelle M.D.   0.25 mg at 03/04/21 0204   • insulin regular (HumuLIN R,NovoLIN R) injection  2-9 Units Subcutaneous 4X/DAY ACHREJI Michelle M.D.   2 Units at 03/04/21 1103    And   • glucose 4 g chewable tablet 16 g  16 g Oral Q15 MIN PRN Mike Michelle M.D.        And   • dextrose 50% (D50W) injection 50 mL  50 mL Intravenous Q15 MIN PRN Mike Michelle M.D.       • hydrALAZINE (APRESOLINE) injection 20 mg  20 mg Intravenous Q6HRS PRN Mike Michelle M.D.   20 mg at 03/04/21 1058   • Netarsudil-Latanoprost 0.02-0.005 % SOLN 1 Drop  1 Drop Both Eyes QHS Colton Shoemaker Jr., D.O.   1 Drop at 03/03/21 2100       Fluids    Intake/Output Summary (Last 24 hours) at 3/4/2021 1143  Last data filed at 3/4/2021 1100  Gross per 24 hour   Intake 860 ml   Output 2835 ml   Net -1975 ml       Laboratory          Recent Labs     03/04/21  0400   SODIUM 139   POTASSIUM 4.1   CHLORIDE 108   CO2 21   BUN 17   CREATININE 0.66   CALCIUM 9.3     Recent Labs     03/04/21  0400   GLUCOSE 196*     Recent Labs     03/04/21  0400   WBC 8.7   NEUTSPOLYS 77.30*   LYMPHOCYTES 15.70*   MONOCYTES 5.90   EOSINOPHILS 0.10   BASOPHILS 0.50     Recent Labs     03/04/21  0400   RBC 4.08*   HEMOGLOBIN 13.0   HEMATOCRIT 39.1   PLATELETCT 188       Imaging  MRI:   Reviewed    Assessment/Plan  Carotid-cavernous fistula- (present on admission)  Assessment & Plan  -Post procedure day 1 endovascular repair  -Nausea and headaches.  MRI per IR recommendations  -She reports improvement in left eye vision loss  -Keep SBP <140 with as needed IV antihypertensives  -Anticoagulation per IR team    Type 2 diabetes mellitus (HCC)- (present on admission)  Assessment & Plan  -Holding home metformin while hospitalized  -Accu-Cheks AC at bedtime with SSI  -Continue home dose of Lantus  -Diabetic diet    Nausea  Assessment & Plan  -Post procedure day #1 endovascular repair  -She has been receiving  both IV and p.o. narcotics for headaches.  Stop IV narcotics.  Use Tylenol for headaches  -Zofran and Reglan  -Discussed with IR.  Start course of Decadron and get MRI orbits    Hyperlipidemia- (present on admission)  Assessment & Plan  -Continue statin    Hypertension- (present on admission)  Assessment & Plan  -Goal SBP <140  -Resume her home furosemide and acetazolamide  -As needed hydralazine and labetalol with parameters    Asthma- (present on admission)  Assessment & Plan  -History of  -Without acute exacerbation  -Continue Singulair       VTE:  Contraindicated  Ulcer: Not Indicated  Lines: Pritchard Catheter  DC today    I have performed a physical exam and reviewed and updated ROS and Plan today (3/4/2021). In review of yesterday's note (3/3/2021), there are no changes except as documented above.     Discussed patient condition and risk of morbidity and/or mortality with Hospitalist, Family, RN, Pharmacy, , Charge nurse / hot rounds, Patient and vascular  The patient remains critically ill.  Critical care time = 38 minutes in directly providing and coordinating critical care and extensive data review.  No time overlap and excludes procedures.

## 2021-03-05 ENCOUNTER — ANESTHESIA EVENT (OUTPATIENT)
Dept: RADIOLOGY | Facility: MEDICAL CENTER | Age: 71
DRG: 271 | End: 2021-03-05
Payer: MEDICARE

## 2021-03-05 ENCOUNTER — APPOINTMENT (OUTPATIENT)
Dept: RADIOLOGY | Facility: MEDICAL CENTER | Age: 71
DRG: 271 | End: 2021-03-05
Attending: NURSE PRACTITIONER
Payer: MEDICARE

## 2021-03-05 ENCOUNTER — ANESTHESIA (OUTPATIENT)
Dept: RADIOLOGY | Facility: MEDICAL CENTER | Age: 71
DRG: 271 | End: 2021-03-05
Payer: MEDICARE

## 2021-03-05 DIAGNOSIS — I67.1 CAROTID-CAVERNOUS FISTULA: ICD-10-CM

## 2021-03-05 PROBLEM — E87.1 HYPONATREMIA: Status: ACTIVE | Noted: 2021-03-05

## 2021-03-05 PROBLEM — E87.6 HYPOKALEMIA: Status: ACTIVE | Noted: 2021-03-05

## 2021-03-05 LAB
ANION GAP SERPL CALC-SCNC: 6 MMOL/L (ref 7–16)
BASOPHILS # BLD AUTO: 0.1 % (ref 0–1.8)
BASOPHILS # BLD: 0.01 K/UL (ref 0–0.12)
BUN SERPL-MCNC: 18 MG/DL (ref 8–22)
CALCIUM SERPL-MCNC: 9.1 MG/DL (ref 8.5–10.5)
CHLORIDE SERPL-SCNC: 108 MMOL/L (ref 96–112)
CO2 SERPL-SCNC: 20 MMOL/L (ref 20–33)
CREAT SERPL-MCNC: 0.61 MG/DL (ref 0.5–1.4)
EOSINOPHIL # BLD AUTO: 0 K/UL (ref 0–0.51)
EOSINOPHIL NFR BLD: 0 % (ref 0–6.9)
ERYTHROCYTE [DISTWIDTH] IN BLOOD BY AUTOMATED COUNT: 46.5 FL (ref 35.9–50)
GLUCOSE BLD-MCNC: 145 MG/DL (ref 65–99)
GLUCOSE BLD-MCNC: 160 MG/DL (ref 65–99)
GLUCOSE BLD-MCNC: 179 MG/DL (ref 65–99)
GLUCOSE SERPL-MCNC: 193 MG/DL (ref 65–99)
HCT VFR BLD AUTO: 39.3 % (ref 37–47)
HGB BLD-MCNC: 13.1 G/DL (ref 12–16)
IMM GRANULOCYTES # BLD AUTO: 0.06 K/UL (ref 0–0.11)
IMM GRANULOCYTES NFR BLD AUTO: 0.6 % (ref 0–0.9)
LYMPHOCYTES # BLD AUTO: 1.01 K/UL (ref 1–4.8)
LYMPHOCYTES NFR BLD: 10.6 % (ref 22–41)
MCH RBC QN AUTO: 31.5 PG (ref 27–33)
MCHC RBC AUTO-ENTMCNC: 33.3 G/DL (ref 33.6–35)
MCV RBC AUTO: 94.5 FL (ref 81.4–97.8)
MONOCYTES # BLD AUTO: 0.33 K/UL (ref 0–0.85)
MONOCYTES NFR BLD AUTO: 3.5 % (ref 0–13.4)
NEUTROPHILS # BLD AUTO: 8.15 K/UL (ref 2–7.15)
NEUTROPHILS NFR BLD: 85.2 % (ref 44–72)
NRBC # BLD AUTO: 0 K/UL
NRBC BLD-RTO: 0 /100 WBC
PLATELET # BLD AUTO: 215 K/UL (ref 164–446)
PMV BLD AUTO: 10.9 FL (ref 9–12.9)
POTASSIUM SERPL-SCNC: 3.5 MMOL/L (ref 3.6–5.5)
RBC # BLD AUTO: 4.16 M/UL (ref 4.2–5.4)
SODIUM SERPL-SCNC: 134 MMOL/L (ref 135–145)
WBC # BLD AUTO: 9.6 K/UL (ref 4.8–10.8)

## 2021-03-05 PROCEDURE — 99232 SBSQ HOSP IP/OBS MODERATE 35: CPT | Performed by: HOSPITALIST

## 2021-03-05 PROCEDURE — 700101 HCHG RX REV CODE 250: Performed by: ANESTHESIOLOGY

## 2021-03-05 PROCEDURE — B31B1ZZ FLUOROSCOPY OF LEFT EXTERNAL CAROTID ARTERY USING LOW OSMOLAR CONTRAST: ICD-10-PCS | Performed by: RADIOLOGY

## 2021-03-05 PROCEDURE — 82962 GLUCOSE BLOOD TEST: CPT

## 2021-03-05 PROCEDURE — 700101 HCHG RX REV CODE 250: Performed by: HOSPITALIST

## 2021-03-05 PROCEDURE — 160002 HCHG RECOVERY MINUTES (STAT)

## 2021-03-05 PROCEDURE — 03LG4DZ OCCLUSION OF INTRACRANIAL ARTERY WITH INTRALUMINAL DEVICE, PERCUTANEOUS ENDOSCOPIC APPROACH: ICD-10-PCS | Performed by: RADIOLOGY

## 2021-03-05 PROCEDURE — 700102 HCHG RX REV CODE 250 W/ 637 OVERRIDE(OP): Performed by: HOSPITALIST

## 2021-03-05 PROCEDURE — 700111 HCHG RX REV CODE 636 W/ 250 OVERRIDE (IP): Performed by: ANESTHESIOLOGY

## 2021-03-05 PROCEDURE — 770022 HCHG ROOM/CARE - ICU (200)

## 2021-03-05 PROCEDURE — 700105 HCHG RX REV CODE 258: Performed by: HOSPITALIST

## 2021-03-05 PROCEDURE — 94760 N-INVAS EAR/PLS OXIMETRY 1: CPT

## 2021-03-05 PROCEDURE — 4410588 IR-EMBOLIZE-NEURO-EXTRACRANIAL

## 2021-03-05 PROCEDURE — 700117 HCHG RX CONTRAST REV CODE 255: Performed by: NURSE PRACTITIONER

## 2021-03-05 PROCEDURE — 85025 COMPLETE CBC W/AUTO DIFF WBC: CPT

## 2021-03-05 PROCEDURE — B41C1ZZ FLUOROSCOPY OF PELVIC ARTERIES USING LOW OSMOLAR CONTRAST: ICD-10-PCS | Performed by: RADIOLOGY

## 2021-03-05 PROCEDURE — 80048 BASIC METABOLIC PNL TOTAL CA: CPT

## 2021-03-05 PROCEDURE — A9270 NON-COVERED ITEM OR SERVICE: HCPCS | Performed by: HOSPITALIST

## 2021-03-05 PROCEDURE — 700105 HCHG RX REV CODE 258: Performed by: ANESTHESIOLOGY

## 2021-03-05 PROCEDURE — 36227 PLACE CATH XTRNL CAROTID: CPT

## 2021-03-05 PROCEDURE — 700111 HCHG RX REV CODE 636 W/ 250 OVERRIDE (IP): Performed by: HOSPITALIST

## 2021-03-05 PROCEDURE — 75894 X-RAYS TRANSCATH THERAPY: CPT

## 2021-03-05 PROCEDURE — B3171ZZ FLUOROSCOPY OF LEFT INTERNAL CAROTID ARTERY USING LOW OSMOLAR CONTRAST: ICD-10-PCS | Performed by: RADIOLOGY

## 2021-03-05 PROCEDURE — 51798 US URINE CAPACITY MEASURE: CPT

## 2021-03-05 PROCEDURE — 61624 TCAT PERM OCCLS/EMBOLJ CNS: CPT

## 2021-03-05 PROCEDURE — 75898 FOLLOW-UP ANGIOGRAPHY: CPT

## 2021-03-05 RX ORDER — CEFAZOLIN SODIUM 1 G/3ML
INJECTION, POWDER, FOR SOLUTION INTRAMUSCULAR; INTRAVENOUS PRN
Status: DISCONTINUED | OUTPATIENT
Start: 2021-03-05 | End: 2021-03-05 | Stop reason: SURG

## 2021-03-05 RX ORDER — HYDROMORPHONE HYDROCHLORIDE 1 MG/ML
0.1 INJECTION, SOLUTION INTRAMUSCULAR; INTRAVENOUS; SUBCUTANEOUS
Status: DISCONTINUED | OUTPATIENT
Start: 2021-03-05 | End: 2021-03-05 | Stop reason: HOSPADM

## 2021-03-05 RX ORDER — HYDROMORPHONE HYDROCHLORIDE 1 MG/ML
0.2 INJECTION, SOLUTION INTRAMUSCULAR; INTRAVENOUS; SUBCUTANEOUS
Status: DISCONTINUED | OUTPATIENT
Start: 2021-03-05 | End: 2021-03-05 | Stop reason: HOSPADM

## 2021-03-05 RX ORDER — OXYCODONE HCL 5 MG/5 ML
5 SOLUTION, ORAL ORAL
Status: DISCONTINUED | OUTPATIENT
Start: 2021-03-05 | End: 2021-03-05 | Stop reason: HOSPADM

## 2021-03-05 RX ORDER — HALOPERIDOL 5 MG/ML
1 INJECTION INTRAMUSCULAR
Status: DISCONTINUED | OUTPATIENT
Start: 2021-03-05 | End: 2021-03-05 | Stop reason: HOSPADM

## 2021-03-05 RX ORDER — MEPERIDINE HYDROCHLORIDE 25 MG/ML
12.5 INJECTION INTRAMUSCULAR; INTRAVENOUS; SUBCUTANEOUS
Status: DISCONTINUED | OUTPATIENT
Start: 2021-03-05 | End: 2021-03-05 | Stop reason: HOSPADM

## 2021-03-05 RX ORDER — HEPARIN SODIUM,PORCINE 1000/ML
VIAL (ML) INJECTION PRN
Status: DISCONTINUED | OUTPATIENT
Start: 2021-03-05 | End: 2021-03-05 | Stop reason: SURG

## 2021-03-05 RX ORDER — POTASSIUM CHLORIDE 20 MEQ/1
40 TABLET, EXTENDED RELEASE ORAL ONCE
Status: COMPLETED | OUTPATIENT
Start: 2021-03-05 | End: 2021-03-05

## 2021-03-05 RX ORDER — ONDANSETRON 2 MG/ML
4 INJECTION INTRAMUSCULAR; INTRAVENOUS
Status: DISCONTINUED | OUTPATIENT
Start: 2021-03-05 | End: 2021-03-05 | Stop reason: HOSPADM

## 2021-03-05 RX ORDER — DEXAMETHASONE SODIUM PHOSPHATE 4 MG/ML
INJECTION, SOLUTION INTRA-ARTICULAR; INTRALESIONAL; INTRAMUSCULAR; INTRAVENOUS; SOFT TISSUE PRN
Status: DISCONTINUED | OUTPATIENT
Start: 2021-03-05 | End: 2021-03-05 | Stop reason: SURG

## 2021-03-05 RX ORDER — HEPARIN SODIUM 1000 [USP'U]/ML
INJECTION, SOLUTION INTRAVENOUS; SUBCUTANEOUS
Status: COMPLETED
Start: 2021-03-05 | End: 2021-03-05

## 2021-03-05 RX ORDER — SODIUM CHLORIDE, SODIUM LACTATE, POTASSIUM CHLORIDE, CALCIUM CHLORIDE 600; 310; 30; 20 MG/100ML; MG/100ML; MG/100ML; MG/100ML
INJECTION, SOLUTION INTRAVENOUS CONTINUOUS
Status: DISCONTINUED | OUTPATIENT
Start: 2021-03-05 | End: 2021-03-05 | Stop reason: HOSPADM

## 2021-03-05 RX ORDER — HYDROMORPHONE HYDROCHLORIDE 1 MG/ML
0.4 INJECTION, SOLUTION INTRAMUSCULAR; INTRAVENOUS; SUBCUTANEOUS
Status: DISCONTINUED | OUTPATIENT
Start: 2021-03-05 | End: 2021-03-05 | Stop reason: HOSPADM

## 2021-03-05 RX ORDER — SODIUM CHLORIDE, SODIUM GLUCONATE, SODIUM ACETATE, POTASSIUM CHLORIDE AND MAGNESIUM CHLORIDE 526; 502; 368; 37; 30 MG/100ML; MG/100ML; MG/100ML; MG/100ML; MG/100ML
INJECTION, SOLUTION INTRAVENOUS
Status: DISCONTINUED | OUTPATIENT
Start: 2021-03-05 | End: 2021-03-05 | Stop reason: SURG

## 2021-03-05 RX ORDER — PHENYLEPHRINE HYDROCHLORIDE 10 MG/ML
INJECTION, SOLUTION INTRAMUSCULAR; INTRAVENOUS; SUBCUTANEOUS PRN
Status: DISCONTINUED | OUTPATIENT
Start: 2021-03-05 | End: 2021-03-05 | Stop reason: SURG

## 2021-03-05 RX ORDER — OXYCODONE HCL 5 MG/5 ML
10 SOLUTION, ORAL ORAL
Status: DISCONTINUED | OUTPATIENT
Start: 2021-03-05 | End: 2021-03-05 | Stop reason: HOSPADM

## 2021-03-05 RX ORDER — DIPHENHYDRAMINE HYDROCHLORIDE 50 MG/ML
12.5 INJECTION INTRAMUSCULAR; INTRAVENOUS
Status: DISCONTINUED | OUTPATIENT
Start: 2021-03-05 | End: 2021-03-05 | Stop reason: HOSPADM

## 2021-03-05 RX ADMIN — PHENYLEPHRINE HYDROCHLORIDE 100 MCG: 10 INJECTION INTRAVENOUS at 12:10

## 2021-03-05 RX ADMIN — PHENYLEPHRINE HYDROCHLORIDE 100 MCG: 10 INJECTION INTRAVENOUS at 11:29

## 2021-03-05 RX ADMIN — ONDANSETRON 4 MG: 2 INJECTION INTRAMUSCULAR; INTRAVENOUS at 11:07

## 2021-03-05 RX ADMIN — INSULIN HUMAN 3 UNITS: 100 INJECTION, SOLUTION PARENTERAL at 09:13

## 2021-03-05 RX ADMIN — FENTANYL CITRATE 50 MCG: 50 INJECTION, SOLUTION INTRAMUSCULAR; INTRAVENOUS at 10:43

## 2021-03-05 RX ADMIN — NYSTATIN: 100000 POWDER TOPICAL at 06:05

## 2021-03-05 RX ADMIN — INSULIN HUMAN 3 UNITS: 100 INJECTION, SOLUTION PARENTERAL at 20:45

## 2021-03-05 RX ADMIN — ACETAZOLAMIDE 250 MG: 250 TABLET ORAL at 18:40

## 2021-03-05 RX ADMIN — ROCURONIUM BROMIDE 50 MG: 10 INJECTION, SOLUTION INTRAVENOUS at 10:44

## 2021-03-05 RX ADMIN — LOSARTAN POTASSIUM 100 MG: 50 TABLET, FILM COATED ORAL at 17:41

## 2021-03-05 RX ADMIN — GLYCOPYRROLATE 0.4 MG: 0.2 INJECTION INTRAMUSCULAR; INTRAVENOUS at 10:50

## 2021-03-05 RX ADMIN — LABETALOL HYDROCHLORIDE 10 MG: 5 INJECTION, SOLUTION INTRAVENOUS at 18:46

## 2021-03-05 RX ADMIN — HEPARIN SODIUM 5000 UNITS: 1000 INJECTION, SOLUTION INTRAVENOUS; SUBCUTANEOUS at 11:02

## 2021-03-05 RX ADMIN — SODIUM CHLORIDE, POTASSIUM CHLORIDE, SODIUM LACTATE AND CALCIUM CHLORIDE 1000 ML: 600; 310; 30; 20 INJECTION, SOLUTION INTRAVENOUS at 13:23

## 2021-03-05 RX ADMIN — MONTELUKAST 10 MG: 10 TABLET, FILM COATED ORAL at 20:55

## 2021-03-05 RX ADMIN — ACETAZOLAMIDE 250 MG: 250 TABLET ORAL at 06:05

## 2021-03-05 RX ADMIN — IOHEXOL 75 ML: 300 INJECTION, SOLUTION INTRAVENOUS at 12:34

## 2021-03-05 RX ADMIN — FENTANYL CITRATE 50 MCG: 50 INJECTION, SOLUTION INTRAMUSCULAR; INTRAVENOUS at 11:04

## 2021-03-05 RX ADMIN — ACETAMINOPHEN 650 MG: 325 TABLET, FILM COATED ORAL at 06:19

## 2021-03-05 RX ADMIN — SODIUM CHLORIDE: 9 INJECTION, SOLUTION INTRAVENOUS at 17:44

## 2021-03-05 RX ADMIN — PROPOFOL 200 MG: 10 INJECTION, EMULSION INTRAVENOUS at 10:43

## 2021-03-05 RX ADMIN — SODIUM CHLORIDE, SODIUM GLUCONATE, SODIUM ACETATE, POTASSIUM CHLORIDE AND MAGNESIUM CHLORIDE: 526; 502; 368; 37; 30 INJECTION, SOLUTION INTRAVENOUS at 10:55

## 2021-03-05 RX ADMIN — INSULIN GLARGINE 30 UNITS: 100 INJECTION, SOLUTION SUBCUTANEOUS at 20:44

## 2021-03-05 RX ADMIN — CEFAZOLIN 2 G: 330 INJECTION, POWDER, FOR SOLUTION INTRAMUSCULAR; INTRAVENOUS at 10:44

## 2021-03-05 RX ADMIN — ATORVASTATIN CALCIUM 10 MG: 10 TABLET, FILM COATED ORAL at 17:41

## 2021-03-05 RX ADMIN — NYSTATIN: 100000 POWDER TOPICAL at 17:42

## 2021-03-05 RX ADMIN — HYDRALAZINE HYDROCHLORIDE 20 MG: 20 INJECTION INTRAMUSCULAR; INTRAVENOUS at 15:49

## 2021-03-05 RX ADMIN — DOCUSATE SODIUM 50 MG AND SENNOSIDES 8.6 MG 2 TABLET: 8.6; 5 TABLET, FILM COATED ORAL at 17:41

## 2021-03-05 RX ADMIN — DORZOLAMIDE HYDROCHLORIDE AND TIMOLOL MALEATE 1 DROP: 20; 5 SOLUTION/ DROPS OPHTHALMIC at 06:05

## 2021-03-05 RX ADMIN — POTASSIUM CHLORIDE 40 MEQ: 1500 TABLET, EXTENDED RELEASE ORAL at 09:13

## 2021-03-05 RX ADMIN — DEXAMETHASONE SODIUM PHOSPHATE 4 MG: 4 INJECTION, SOLUTION INTRA-ARTICULAR; INTRALESIONAL; INTRAMUSCULAR; INTRAVENOUS; SOFT TISSUE at 11:07

## 2021-03-05 RX ADMIN — ACETAMINOPHEN 650 MG: 325 TABLET, FILM COATED ORAL at 20:55

## 2021-03-05 RX ADMIN — DORZOLAMIDE HYDROCHLORIDE AND TIMOLOL MALEATE 1 DROP: 20; 5 SOLUTION/ DROPS OPHTHALMIC at 18:47

## 2021-03-05 ASSESSMENT — ENCOUNTER SYMPTOMS
COUGH: 0
FEVER: 0
HEADACHES: 1
BLURRED VISION: 1
CHILLS: 0
SHORTNESS OF BREATH: 0
DIZZINESS: 0

## 2021-03-05 ASSESSMENT — COGNITIVE AND FUNCTIONAL STATUS - GENERAL
DAILY ACTIVITIY SCORE: 19
DRESSING REGULAR LOWER BODY CLOTHING: A LITTLE
PERSONAL GROOMING: A LITTLE
WALKING IN HOSPITAL ROOM: A LITTLE
STANDING UP FROM CHAIR USING ARMS: A LITTLE
MOVING TO AND FROM BED TO CHAIR: A LITTLE
SUGGESTED CMS G CODE MODIFIER DAILY ACTIVITY: CK
DRESSING REGULAR UPPER BODY CLOTHING: A LITTLE
TOILETING: A LITTLE
TURNING FROM BACK TO SIDE WHILE IN FLAT BAD: A LITTLE
CLIMB 3 TO 5 STEPS WITH RAILING: A LITTLE
HELP NEEDED FOR BATHING: A LITTLE
MOVING FROM LYING ON BACK TO SITTING ON SIDE OF FLAT BED: A LITTLE
MOBILITY SCORE: 18
SUGGESTED CMS G CODE MODIFIER MOBILITY: CK

## 2021-03-05 ASSESSMENT — PAIN DESCRIPTION - PAIN TYPE
TYPE: SURGICAL PAIN
TYPE: ACUTE PAIN
TYPE: SURGICAL PAIN
TYPE: SURGICAL PAIN

## 2021-03-05 ASSESSMENT — LIFESTYLE VARIABLES
ALCOHOL_USE: YES
TOTAL SCORE: 0
EVER FELT BAD OR GUILTY ABOUT YOUR DRINKING: NO
TOTAL SCORE: 0
ON A TYPICAL DAY WHEN YOU DRINK ALCOHOL HOW MANY DRINKS DO YOU HAVE: 0
AVERAGE NUMBER OF DAYS PER WEEK YOU HAVE A DRINK CONTAINING ALCOHOL: 0
TOTAL SCORE: 0
HAVE YOU EVER FELT YOU SHOULD CUT DOWN ON YOUR DRINKING: NO
HOW MANY TIMES IN THE PAST YEAR HAVE YOU HAD 5 OR MORE DRINKS IN A DAY: 0
HAVE PEOPLE ANNOYED YOU BY CRITICIZING YOUR DRINKING: NO
EVER HAD A DRINK FIRST THING IN THE MORNING TO STEADY YOUR NERVES TO GET RID OF A HANGOVER: NO
CONSUMPTION TOTAL: NEGATIVE

## 2021-03-05 ASSESSMENT — FIBROSIS 4 INDEX: FIB4 SCORE: 2.14

## 2021-03-05 ASSESSMENT — PAIN SCALES - GENERAL: PAIN_LEVEL: 1

## 2021-03-05 NOTE — ANESTHESIA PREPROCEDURE EVALUATION
Relevant Problems   PULMONARY   (+) Asthma      CARDIAC   (+) Carotid-cavernous fistula   (+) Hypertension      ENDO   (+) Type 2 diabetes mellitus (HCC)       Physical Exam    Airway   Mallampati: II  TM distance: >3 FB  Neck ROM: full       Cardiovascular - normal exam  Rhythm: regular  Rate: normal  (-) murmur     Dental - normal exam           Pulmonary - normal exam  Breath sounds clear to auscultation     Abdominal    Neurological - normal exam                 Anesthesia Plan    ASA 4       Plan - general       Airway plan will be ETT          Induction: intravenous    Postoperative Plan: Postoperative administration of opioids is intended.    Pertinent diagnostic labs and testing reviewed    Informed Consent:    Anesthetic plan and risks discussed with patient.    Use of blood products discussed with: patient whom consented to blood products.

## 2021-03-05 NOTE — PROGRESS NOTES
Pt a+ox 4. Ambulates with a walker and stand by assist. Pt denies any pain at this time. Able to make needs known. Significant swelling noted to left eye. Pt NPO for possible procedure on 3/5/21. Cath sites checked in both groins. No s/s of hematomas noted. Serosanginous drainage noted to the left cath site. Will monitor.

## 2021-03-05 NOTE — OR SURGEON
Immediate Post- Operative Note        PostOp Diagnosis: [facility]      Procedure(s): Stage 2 endovascular repair       Estimated Blood Loss: Less than 5 ml        Complications: None            3/5/2021     12:33 PM     Rojelio Burnett M.D.

## 2021-03-05 NOTE — CARE PLAN
Problem: Safety  Goal: Will remain free from injury  Outcome: PROGRESSING AS EXPECTED  Note: Pt at risk for falls due to recent procedure. Call light with in reach. Bed alarm set. Will continue to monitor.     Problem: Skin Integrity  Goal: Risk for impaired skin integrity will decrease  Outcome: PROGRESSING AS EXPECTED  Intervention: Assess and monitor skin integrity, appearance and/or temperature  Note: Pt skin remains intact. Pt is self turning. Pt ambulating with walker and stand by assist.

## 2021-03-05 NOTE — ANESTHESIA TIME REPORT
Anesthesia Start and Stop Event Times     Date Time Event    3/5/2021 0945 Ready for Procedure     1031 Anesthesia Start     1243 Anesthesia Stop        Responsible Staff  03/05/21    Name Role Begin End    Matt Mcfarland M.D. Anesth 1031 1243        Preop Diagnosis (Free Text):  Pre-op Diagnosis             Preop Diagnosis (Codes):    Post op Diagnosis  Carotid artery-cavernous sinus fistula      Premium Reason  Non-Premium    Comments:                                                                  Embolization fistula

## 2021-03-05 NOTE — PROGRESS NOTES
Hospital Medicine Daily Progress Note    Date of Service  3/5/2021    Chief Complaint  70 y.o. female admitted 3/3/2021 with headache    Hospital Course    70-year-old female with history of diabetes hypertension and headache and left arm swelling and pain secondary to low flow carotid cavernous fistula admitted on 3/3/2021 and underwent embolization by neuro interventional radiology    Interval Problem Update    Left eye blurred vision is about the same  No further nausea or vomiting  Complains of mild headache  Afebrile  Failed voiding trial Pritchard catheter replaced        Consultants/Specialty  Neuro interventional radiology  Critical care    Code Status  Full Code    Disposition  Neurology floor    Review of Systems  Review of Systems   Constitutional: Negative for chills and fever.   Eyes: Positive for blurred vision.   Respiratory: Negative for cough and shortness of breath.    Cardiovascular: Negative for chest pain.   Neurological: Positive for headaches. Negative for dizziness.   All other systems reviewed and are negative.       Physical Exam  Temp:  [36.2 °C (97.1 °F)-37.4 °C (99.4 °F)] 37.4 °C (99.4 °F)  Pulse:  [51-83] 60  Resp:  [13-16] 16  BP: (124-165)/(68-88) 144/68  SpO2:  [91 %-97 %] 96 %    Physical Exam  Vitals and nursing note reviewed.   Constitutional:       General: She is not in acute distress.  HENT:      Head: Normocephalic and atraumatic.      Nose: Nose normal. No rhinorrhea.      Mouth/Throat:      Pharynx: No oropharyngeal exudate or posterior oropharyngeal erythema.   Eyes:      General:         Right eye: No discharge.         Left eye: No discharge.      Comments: Mild anisocoria  Left conjunctival edema and erythema about the same   Cardiovascular:      Rate and Rhythm: Normal rate and regular rhythm.      Heart sounds: Normal heart sounds. No murmur. No friction rub. No gallop.    Pulmonary:      Effort: Pulmonary effort is normal. No respiratory distress.      Breath sounds: No  stridor. No rales.   Chest:      Chest wall: No tenderness.   Abdominal:      General: There is no distension.      Palpations: Abdomen is soft. There is no mass.      Tenderness: There is no abdominal tenderness. There is no guarding or rebound.   Musculoskeletal:         General: No swelling or tenderness.      Cervical back: Neck supple. No rigidity.   Skin:     General: Skin is warm and dry.      Coloration: Skin is not cyanotic or jaundiced.      Nails: There is no clubbing.   Neurological:      Mental Status: She is alert and oriented to person, place, and time.      Cranial Nerves: Cranial nerve deficit (Per patient blurred vision in left eye) present.      Motor: No weakness.   Psychiatric:         Mood and Affect: Mood normal.         Behavior: Behavior normal.         Fluids    Intake/Output Summary (Last 24 hours) at 3/5/2021 0903  Last data filed at 3/4/2021 1200  Gross per 24 hour   Intake 30 ml   Output 125 ml   Net -95 ml       Laboratory  Recent Labs     03/04/21  0400 03/05/21  0237   WBC 8.7 9.6   RBC 4.08* 4.16*   HEMOGLOBIN 13.0 13.1   HEMATOCRIT 39.1 39.3   MCV 95.8 94.5   MCH 31.9 31.5   MCHC 33.2* 33.3*   RDW 47.2 46.5   PLATELETCT 188 215   MPV 10.7 10.9     Recent Labs     03/04/21  0400 03/05/21  0237   SODIUM 139 134*   POTASSIUM 4.1 3.5*   CHLORIDE 108 108   CO2 21 20   GLUCOSE 196* 193*   BUN 17 18   CREATININE 0.66 0.61   CALCIUM 9.3 9.1                   Imaging  MR-ORBITS,FACE,NECK-WITH&W/O & SEQUENCES   Final Result      1.  There are changes secondary to the endovascular repair of carotid cavernous fistula. There are abnormal left cavernous sinus flow voids consistent with residual left-sided CC fistula. Please note the left-sided carotid-cavernous fistula was not    repaired completely.   2.  There is edematous bilateral extraocular muscles. This is unchanged since the previous MRI dated 1/26/2021.   3.  Mild cerebral volume loss.   4.  Mild chronic microvascular ischemic disease.       IR-EMBOLIZE-NEURO-EXTRACRANIAL    (Results Pending)   IR-EMBOLIZE-NEURO-EXTRACRANIAL    (Results Pending)        Assessment/Plan  Carotid-cavernous fistula- (present on admission)  Assessment & Plan  Status post endovascular repair    Patient with left vision changes and persistent edema discussed with neuro interventional radiology plan today is to proceed with complete chain of endovascular repair of carotid-cavernous fistula        Type 2 diabetes mellitus (HCC)- (present on admission)  Assessment & Plan  With hyperglycemia    Metformin on hold  Continue Lantus and sliding scale insulin  Monitor glucose and adjust insulin accordingly with changes in steroid dose      Recent Labs     03/03/21  1253 03/03/21  1646 03/03/21  2103 03/04/21  0703 03/04/21  1056 03/04/21  1759 03/04/21  2053 03/05/21  0910   POCGLUCOSE 146* 113* 164* 176* 178* 193* 208* 160*        Hyponatremia  Assessment & Plan  Mild    Monitor levels    Hypokalemia  Assessment & Plan  Replete and monitor    Nausea  Assessment & Plan  Resolved    Hyperlipidemia- (present on admission)  Assessment & Plan  Continue atorvastatin    Hypertension- (present on admission)  Assessment & Plan  Continue losartan  Furosemide on hold given poor intake and n.p.o. status resume postprocedure when tolerating diet    Asthma- (present on admission)  Assessment & Plan  No acute exacerbation  Stable on Singulair         VTE prophylaxis: SCD

## 2021-03-05 NOTE — OR NURSING
1241: received to PACU via bed with oral airway. Respirations spontaneous and unlabored. Oral airway dc'd upon arrival to PACU without problem or difficulty. Gauze and tegaderm dressing to bilateral groin CDI. No hematoma, bleeding or swelling noted. Bilateral pedal pulses present using doppler. intructed patient to remain flat in bed and to keep legs straight at all time. Verbalizes understanding.  1315: sips of water to drink given. Tolerated well. Denies pain or nausea. Patient claims vision is still blurry but not as bad as it used to.  1345: Dr. Pinto notified regarding Neuroscience unable to do Q1 hour neuro check. Order placed for patient to go to ICU.  1430: Pulses checked using the doppler. Present.  1500: report called to Qian CHAMPION.  1513: transported via bed to RICU 111 via bed by RN and CNA. O2 at 2 L / nc, on Cardiac and Pulse ox monitor. Stable. Patient wearing face mask. Bedside report given to Qian CHAMPION.

## 2021-03-05 NOTE — ANESTHESIA PROCEDURE NOTES
Airway    Date/Time: 3/5/2021 10:44 AM  Performed by: Matt Mcfarland M.D.  Authorized by: Matt Mcfarland M.D.     Location:  OR  Urgency:  Elective  Indications for Airway Management:  Anesthesia      Spontaneous Ventilation: absent    Sedation Level:  Deep  Preoxygenated: Yes    Patient Position:  Sniffing  Final Airway Type:  Endotracheal airway  Final Endotracheal Airway:  ETT  Cuffed: Yes    Technique Used for Successful ETT Placement:  Direct laryngoscopy    Insertion Site:  Oral  Blade Type:  Ruiz  Laryngoscope Blade/Videolaryngoscope Blade Size:  2  ETT Size (mm):  7.5  Measured from:  Teeth  ETT to Teeth (cm):  21  Placement Verified by: auscultation and capnometry    Cormack-Lehane Classification:  Grade I - full view of glottis  Number of Attempts at Approach:  1

## 2021-03-05 NOTE — ANESTHESIA POSTPROCEDURE EVALUATION
Patient: Darnell Kirkland    Procedure Summary     Date: 03/05/21 Room / Location: [facility] - INTERVENTIONAL  [facility]    Anesthesia Start: 1031 Anesthesia Stop: 1243    Procedure: IR-EMBOLIZE-NEURO-EXTRACRANIAL Diagnosis:       Cerebral aneurysm, nonruptured      Cerebral aneurysm, nonruptured      Cerebrovascular malformation, dural AV fistula      Carotid-cavernous fistula      Cerebral aneurysm, nonruptured      Cerebral aneurysm, nonruptured      Cerebrovascular malformation, dural AV fistula      Carotid-cavernous fistula      (Vascular malformation)      (Embolize [facility], s/p intervention 3/3/21 with new vision loss)    Scheduled Providers: Matt Mcfarland M.D. Responsible Provider: Matt Mcfarland M.D.    Anesthesia Type: general ASA Status: 4          Final Anesthesia Type: general  Last vitals  BP   Blood Pressure : 144/68    Temp   37.4 °C (99.4 °F)    Pulse   60   Resp   16    SpO2   96 %      Anesthesia Post Evaluation    Patient location during evaluation: PACU  Patient participation: complete - patient participated  Level of consciousness: awake and alert  Pain score: 1    Airway patency: patent  Anesthetic complications: no  Cardiovascular status: adequate and hemodynamically stable  Respiratory status: acceptable  Hydration status: acceptable    PONV: none          No complications documented.     Nurse Pain Score: 0 (NPRS)

## 2021-03-05 NOTE — PROGRESS NOTES
IR RN note    Patient underwent a Embolization of carotid-cavernous fistula by Dr. Burnett.  GA MD Mcfarland. Procedure site was verified by MD using imaging guidance. Consents were signed.  IR Kanika Soto and Ela assisted. Patient was placed in a supine position.  Vitals were managed by GA, see chart.  Right groin, venous, access site. Left arterial line access- DC after procedure.    An Angio Seal, gauze and tegaderm dressing was placed over surgical site. Report called to Roro CHAMPION. Pt transported by rdennis with RN to [facility], with monitor .   Reviewed sedation orders with MD RIZO procedure ended at 12:26    Left cavernous vein   Jamir 18 liquid embolic system  REF # [id number]  LOT # [id number]  Exp. 08-    Angio Seal 8F  Ref#[id number]  Lot#[id number]

## 2021-03-06 VITALS
DIASTOLIC BLOOD PRESSURE: 59 MMHG | SYSTOLIC BLOOD PRESSURE: 128 MMHG | OXYGEN SATURATION: 95 % | HEIGHT: 67 IN | WEIGHT: 231.7 LBS | HEART RATE: 60 BPM | RESPIRATION RATE: 30 BRPM | BODY MASS INDEX: 36.37 KG/M2 | TEMPERATURE: 98.1 F

## 2021-03-06 PROBLEM — E87.1 HYPONATREMIA: Status: RESOLVED | Noted: 2021-03-05 | Resolved: 2021-03-06

## 2021-03-06 PROBLEM — R11.0 NAUSEA: Status: RESOLVED | Noted: 2021-03-04 | Resolved: 2021-03-06

## 2021-03-06 PROBLEM — E87.6 HYPOKALEMIA: Status: RESOLVED | Noted: 2021-03-05 | Resolved: 2021-03-06

## 2021-03-06 LAB
ANION GAP SERPL CALC-SCNC: 8 MMOL/L (ref 7–16)
BUN SERPL-MCNC: 18 MG/DL (ref 8–22)
CALCIUM SERPL-MCNC: 8.9 MG/DL (ref 8.5–10.5)
CHLORIDE SERPL-SCNC: 112 MMOL/L (ref 96–112)
CO2 SERPL-SCNC: 19 MMOL/L (ref 20–33)
CREAT SERPL-MCNC: 0.66 MG/DL (ref 0.5–1.4)
GLUCOSE BLD-MCNC: 108 MG/DL (ref 65–99)
GLUCOSE BLD-MCNC: 153 MG/DL (ref 65–99)
GLUCOSE SERPL-MCNC: 114 MG/DL (ref 65–99)
MAGNESIUM SERPL-MCNC: 1.8 MG/DL (ref 1.5–2.5)
POTASSIUM SERPL-SCNC: 3.7 MMOL/L (ref 3.6–5.5)
SODIUM SERPL-SCNC: 139 MMOL/L (ref 135–145)

## 2021-03-06 PROCEDURE — 80048 BASIC METABOLIC PNL TOTAL CA: CPT

## 2021-03-06 PROCEDURE — A9270 NON-COVERED ITEM OR SERVICE: HCPCS | Performed by: HOSPITALIST

## 2021-03-06 PROCEDURE — 700102 HCHG RX REV CODE 250 W/ 637 OVERRIDE(OP): Performed by: HOSPITALIST

## 2021-03-06 PROCEDURE — 99239 HOSP IP/OBS DSCHRG MGMT >30: CPT | Performed by: HOSPITALIST

## 2021-03-06 PROCEDURE — 83735 ASSAY OF MAGNESIUM: CPT

## 2021-03-06 PROCEDURE — 82962 GLUCOSE BLOOD TEST: CPT

## 2021-03-06 PROCEDURE — 51798 US URINE CAPACITY MEASURE: CPT

## 2021-03-06 RX ORDER — ACETAMINOPHEN 325 MG/1
650 TABLET ORAL EVERY 6 HOURS PRN
Refills: 0 | Status: ON HOLD | COMMUNITY
Start: 2021-03-06 | End: 2021-09-27

## 2021-03-06 RX ORDER — TAMSULOSIN HYDROCHLORIDE 0.4 MG/1
0.4 CAPSULE ORAL DAILY
Qty: 30 CAPSULE | Refills: 0 | Status: SHIPPED | OUTPATIENT
Start: 2021-03-06 | End: 2021-09-13

## 2021-03-06 RX ADMIN — CYANOCOBALAMIN TAB 500 MCG 1000 MCG: 500 TAB at 05:53

## 2021-03-06 RX ADMIN — ACETAMINOPHEN 650 MG: 325 TABLET, FILM COATED ORAL at 05:58

## 2021-03-06 RX ADMIN — INSULIN HUMAN 3 UNITS: 100 INJECTION, SOLUTION PARENTERAL at 13:46

## 2021-03-06 RX ADMIN — ACETAZOLAMIDE 250 MG: 250 TABLET ORAL at 05:55

## 2021-03-06 RX ADMIN — THIAMINE HCL TAB 100 MG 100 MG: 100 TAB at 06:00

## 2021-03-06 RX ADMIN — NYSTATIN: 100000 POWDER TOPICAL at 05:55

## 2021-03-06 RX ADMIN — DOCUSATE SODIUM 50 MG AND SENNOSIDES 8.6 MG 2 TABLET: 8.6; 5 TABLET, FILM COATED ORAL at 05:53

## 2021-03-06 RX ADMIN — DORZOLAMIDE HYDROCHLORIDE AND TIMOLOL MALEATE 1 DROP: 20; 5 SOLUTION/ DROPS OPHTHALMIC at 05:58

## 2021-03-06 ASSESSMENT — PAIN DESCRIPTION - PAIN TYPE
TYPE: ACUTE PAIN

## 2021-03-06 NOTE — DISCHARGE SUMMARY
Discharge Summary    CHIEF COMPLAINT ON ADMISSION  No chief complaint on file.      Reason for Admission  Cerebral aneurysm, nonruptured     Admission Date  3/3/2021    CODE STATUS  Full Code    HPI & HOSPITAL COURSE  This is a 70 y.o. female here with history of diabetes hypertension and carotid-cavernous low-flow fistula she was admitted for elective endovascular repair underwent first stage on 3/3/2021 by neuro interventional radiology.  The following morning the patient was continuing to have significant swelling and pain blurred vision in her left eye recommended proceeding with cystoscopy endovascular repair during this hospitalization which was completed on 3/5/2021.  This morning the patient's vision is improved.  Conjunctival edema and injection is also improved.  She had urinary retention and failed voiding trial will have Pritchard catheter replaced and she will be discharged with a Pritchard catheter.  We will start labs discussed with the patient to follow-up with her PCP later this week for another voiding trial.  Also initiated home health referral.  Discussed with interventional radiology and she is cleared for discharge from their standpoint.        Therefore, she is discharged in good and stable condition to home with close outpatient follow-up.    The patient met 2-midnight criteria for an inpatient stay at the time of discharge.    Discharge Date  3/6/2021    FOLLOW UP ITEMS POST DISCHARGE  Follow-up with interventional radiology  Follow-up with PCP for voiding trial and if she fails referral to urology    DISCHARGE DIAGNOSES  Active Problems:    Carotid-cavernous fistula POA: Yes    Type 2 diabetes mellitus (HCC) POA: Yes    Hypertension POA: Yes    Hyperlipidemia POA: Yes    Asthma POA: Yes  Resolved Problems:    Nausea POA: Unknown    Hypokalemia POA: Unknown    Hyponatremia POA: Unknown  Urinary retention    FOLLOW UP  No future appointments.  Scout Alexis M.D.  [address] [place] [address]  [id number]  [phone]          Rojelio Burnett M.D.  [address] [facility]  [id number]  [place] [address]  [phone]            MEDICATIONS ON DISCHARGE     Medication List      START taking these medications      Instructions   acetaminophen 325 MG Tabs  Commonly known as: Tylenol   Take 2 Tablets by mouth every 6 hours as needed (Mild Pain; (Pain scale 1-3); Temp greater than 100.5 F).  Dose: 650 mg     tamsulosin 0.4 MG capsule  Commonly known as: FLOMAX   Take 1 capsule by mouth every day.  Dose: 0.4 mg        CONTINUE taking these medications      Instructions   acetaZOLAMIDE 250 MG Tabs  Commonly known as: DIAMOX   Take 250 mg by mouth 2 times a day.  Dose: 250 mg     aspirin EC 81 MG Tbec  Commonly known as: ECOTRIN   Take 81 mg by mouth every day.  Dose: 81 mg     atorvastatin 10 MG Tabs  Commonly known as: LIPITOR   Take 10 mg by mouth every evening.  Dose: 10 mg     Bepreve 1.5 % Soln  Generic drug: Bepotastine Besilate   Administer 1 Drop into the left eye as needed.  Dose: 1 Drop     Cinnamon 500 MG Caps   Take  by mouth.     cyanocobalamin 1000 MCG Tabs  Commonly known as: VITAMIN B12   Take 1,000 mcg by mouth every day.  Dose: 1,000 mcg     diphenhydrAMINE 25 MG Tabs  Commonly known as: BENADRYL   Take 25 mg by mouth at bedtime as needed for Sleep.  Dose: 25 mg     dorzolamide-timolol 22.3-6.8 MG/ML Soln  Commonly known as: COSOPT   Administer 1 Drop into both eyes 2 times a day.  Dose: 1 Drop     furosemide 40 MG Tabs  Commonly known as: LASIX   Take 20 mg by mouth every day.  Dose: 20 mg     GLUCOSAMINE CHOND CMP ADVANCED PO   Take 1 tablet by mouth every day.  Dose: 1 tablet     insulin aspart 100 UNIT/ML Soln  Commonly known as: NovoLOG   Inject 2-16 Units under the skin 3 times a day before meals. If BS  = 2 units  High 200=16 units  Dose: 2-16 Units     latanoprost 0.005 % Soln  Commonly known as: XALATAN   Administer 1 Drop into both eyes every bedtime.  Dose: 1 Drop     losartan 100 MG  Tabs  Commonly known as: COZAAR   Take 100 mg by mouth every evening.  Dose: 100 mg     metformin 1000 MG tablet  Commonly known as: GLUCOPHAGE   Take 1,000 mg by mouth 2 times a day, with meals.  Dose: 1,000 mg     montelukast 10 MG Tabs  Commonly known as: SINGULAIR   Take 10 mg by mouth every bedtime.  Dose: 10 mg     naproxen 250 MG Tabs  Commonly known as: NAPROSYN   Take 250 mg by mouth 1 time a day as needed.  Dose: 250 mg     therapeutic multivitamin-minerals Tabs   Take 1 Tab by mouth every day.  Dose: 1 tablet     thiamine 100 MG tablet  Commonly known as: THIAMINE   Take 100 mg by mouth every day.  Dose: 100 mg     Toujeo Max SoloStar 300 UNIT/ML Sopn  Generic drug: Insulin Glargine (2 Unit Dial)   Inject 38 Units under the skin every bedtime.  Dose: 38 Units     VITAMIN B COMPLEX PO   Take 1 tablet by mouth every day.  Dose: 1 tablet     vitamin D 1000 Unit (25 mcg) Tabs  Commonly known as: cholecalciferol   Take 1,000 Units by mouth every day.  Dose: 1,000 Units            Allergies  Allergies   Allergen Reactions   • Clindamycin      Hives       DIET  Orders Placed This Encounter   Procedures   • Diet Order Diet: Consistent CHO (Diabetic)     Standing Status:   Standing     Number of Occurrences:   1     Order Specific Question:   Diet:     Answer:   Consistent CHO (Diabetic) [4]       ACTIVITY  As tolerated.  Weight bearing as tolerated    CONSULTATIONS  Critical care  Neuro-Interventional radiology    PROCEDURES  Endovascular repair 2 staged for carotid cavernous fistula    LABORATORY  Lab Results   Component Value Date    SODIUM 139 03/06/2021    POTASSIUM 3.7 03/06/2021    CHLORIDE 112 03/06/2021    CO2 19 (L) 03/06/2021    GLUCOSE 114 (H) 03/06/2021    BUN 18 03/06/2021    CREATININE 0.66 03/06/2021        Lab Results   Component Value Date    WBC 9.6 03/05/2021    HEMOGLOBIN 13.1 03/05/2021    HEMATOCRIT 39.3 03/05/2021    PLATELETCT 215 03/05/2021        Total time of the discharge process  exceeds 35 minutes.

## 2021-03-06 NOTE — PROGRESS NOTES
Spoke with [facility]. Patient choices of home health faxed to [facility]. Per Dr. Isa Michelle, patient okay to discharge prior to acceptance.

## 2021-03-06 NOTE — DISCHARGE PLANNING
Anticipated Discharge Disposition: home with home health    Action: Met with patient to get choice for [facility]. Patient chose:     1. [facility]    2. [facility]    3.  [facility]     Faxed to [facility].       Barriers to Discharge: medical clearance    Plan: LSW to assist as needed and monitor for barriers to discharge.

## 2021-03-06 NOTE — PROGRESS NOTES
Dr. Isa Michelle notified of bladder scan result and patient unable to void. Plan to discharge patient home with yuan per Dr. Isa Michelle.

## 2021-03-06 NOTE — DISCHARGE PLANNING
Received Choice form at [phone]  Agency/Facility Name: [facility]  Referral sent per Choice form @ [phone]

## 2021-03-06 NOTE — PROGRESS NOTES
2 RN skin check complete with AKIRA Lopez  Devices in place SPO2 probe, EKG leads, BP cuff.  Skin assessed under devices every 2 hours, pillows under elbows and heels. Pillows repositioned every 2 hours.  Sacrum red and blanching, mepilex placed. Heels red and blanching.

## 2021-03-06 NOTE — CARE PLAN
Problem: Communication  Goal: The ability to communicate needs accurately and effectively will improve  Outcome: PROGRESSING AS EXPECTED  Note: Educated patient on the use of call light for assistance. Went over controls and functions on the remote. Answered any questions patient had.      Problem: Knowledge Deficit  Goal: Knowledge of disease process/condition, treatment plan, diagnostic tests, and medications will improve  Outcome: PROGRESSING AS EXPECTED  Note: Went over plan of care with patient and answered any questions patient had.

## 2021-03-06 NOTE — FACE TO FACE
Face to Face Supporting Documentation - Home Health    The encounter with this patient was in whole or in part the primary reason for home health admission.    Date of encounter:   Patient:                    MRN:                       YOB: 2020  Darnell Kirkland  [id number]  1950     Home health to see patient for:  Skilled Nursing care for assessment, interventions & education, Physical Therapy evaluation and treatment and Occupational therapy evaluation and treatment    Skilled need for:  Surgical Aftercare endovascular repair of carotid cavernous fistula    Skilled nursing interventions to include:  Line/Drain/Airway education and care    Homebound status evidenced by:  Needs the assistance of another person in order to leave the home. Leaving home requires a considerable and taxing effort. There is a normal inability to leave the home.    Community Physician to provide follow up care: Scout Alexis M.D.     Optional Interventions? No      I certify the face to face encounter for this home health care referral meets the CMS requirements and the encounter/clinical assessment with the patient was, in whole, or in part, for the medical condition(s) listed above, which is the primary reason for home health care. Based on my clinical findings: the service(s) are medically necessary, support the need for home health care, and the homebound criteria are met.  I certify that this patient has had a face to face encounter by myself.  Mike Michelle M.D. - NPI: [id number]

## 2021-03-06 NOTE — DISCHARGE INSTRUCTIONS
Discharge Instructions    Discharged to home by car with relative. Discharged via wheelchair, hospital escort: Yes.  Special equipment needed: Wheelchair    Be sure to schedule a follow-up appointment with your primary care doctor or any specialists as instructed.     Discharge Plan:   Influenza Vaccine Indication: Not indicated: Previously immunized this influenza season and > 8 years of age    I understand that a diet low in cholesterol, fat, and sodium is recommended for good health. Unless I have been given specific instructions below for another diet, I accept this instruction as my diet prescription.   Other diet: Diabetic diet    Special Instructions: None    · Is patient discharged on Warfarin / Coumadin?   No     Depression / Suicide Risk    As you are discharged from this [facility] facility, it is important to learn how to keep safe from harming yourself.    Recognize the warning signs:  · Abrupt changes in personality, positive or negative- including increase in energy   · Giving away possessions  · Change in eating patterns- significant weight changes-  positive or negative  · Change in sleeping patterns- unable to sleep or sleeping all the time   · Unwillingness or inability to communicate  · Depression  · Unusual sadness, discouragement and loneliness  · Talk of wanting to die  · Neglect of personal appearance   · Rebelliousness- reckless behavior  · Withdrawal from people/activities they love  · Confusion- inability to concentrate     If you or a loved one observes any of these behaviors or has concerns about self-harm, here's what you can do:  · Talk about it- your feelings and reasons for harming yourself  · Remove any means that you might use to hurt yourself (examples: pills, rope, extension cords, firearm)  · Get professional help from the community (Mental Health, Substance Abuse, psychological counseling)  · Do not be alone:Call your Safe Contact- someone whom you trust who will be there  for you.  · Call your local CRISIS HOTLINE 785-2365 or 200-552-1632  · Call your local Children's Mobile Crisis Response Team [place] (839) 679-1622 or www.S[employer]  · Call the toll free National Suicide Prevention Hotlines   · National Suicide Prevention Lifeline 606-274-CUQS (2656)  · [facility] Line Network 800-SUICIDE (761-1065)      Indwelling Urinary Catheter Care, Adult  An indwelling urinary catheter is a thin tube that is put into your bladder. The tube helps to drain pee (urine) out of your body. The tube goes in through your urethra. Your urethra is where pee comes out of your body. Your pee will come out through the catheter, then it will go into a bag (drainage bag).  Take good care of your catheter so it will work well.  How to wear your catheter and bag  Supplies needed  · Sticky tape (adhesive tape) or a leg strap.  · Alcohol wipe or soap and water (if you use tape).  · A clean towel (if you use tape).  · Large overnight bag.  · Smaller bag (leg bag).  Wearing your catheter  Attach your catheter to your leg with tape or a leg strap.  · Make sure the catheter is not pulled tight.  · If a leg strap gets wet, take it off and put on a dry strap.  · If you use tape to hold the bag on your le. Use an alcohol wipe or soap and water to wash your skin where the tape made it sticky before.  2. Use a clean towel to pat-dry that skin.  3. Use new tape to make the bag stay on your leg.  Wearing your bags  You should have been given a large overnight bag.  · You may wear the overnight bag in the day or night.  · Always have the overnight bag lower than your bladder.  Do not let the bag touch the floor.  · Before you go to sleep, put a clean plastic bag in a wastebasket. Then hang the overnight bag inside the wastebasket.  You should also have a smaller leg bag that fits under your clothes.  · Always wear the leg bag below your knee.  · Do not wear your leg bag at night.  How to care for your  skin and catheter  Supplies needed  · A clean washcloth.  · Water and mild soap.  · A clean towel.  Caring for your skin and catheter         · Clean the skin around your catheter every day:  ? Wash your hands with soap and water.  ? Wet a clean washcloth in warm water and mild soap.  ? Clean the skin around your urethra.  ? If you are female:  ? Gently spread the folds of skin around your vagina (labia).  ? With the washcloth in your other hand, wipe the inner side of your labia on each side. Wipe from front to back.  ? If you are male:  ? Pull back any skin that covers the end of your penis (foreskin).  ? With the washcloth in your other hand, wipe your penis in small circles. Start wiping at the tip of your penis, then move away from the catheter.  ? Move the foreskin back in place, if needed.  ? With your free hand, hold the catheter close to where it goes into your body.  ? Keep holding the catheter during cleaning so it does not get pulled out.  ? With the washcloth in your other hand, clean the catheter.  ? Only wipe downward on the catheter.  ? Do not wipe upward toward your body. Doing this may push germs into your urethra and cause infection.  ? Use a clean towel to pat-dry the catheter and the skin around it. Make sure to wipe off all soap.  ? Wash your hands with soap and water.  · Shower every day. Do not take baths.  · Do not use cream, ointment, or lotion on the area where the catheter goes into your body, unless your doctor tells you to.  · Do not use powders, sprays, or lotions on your genital area.  · Check your skin around the catheter every day for signs of infection. Check for:  ? Redness, swelling, or pain.  ? Fluid or blood.  ? Warmth.  ? Pus or a bad smell.  How to empty the bag  Supplies needed  · Rubbing alcohol.  · Gauze pad or cotton ball.  · Tape or a leg strap.  Emptying the bag  Pour the pee out of your bag when it is ?-½ full, or at least 2-3 times a day. Do this for your overnight  bag and your leg bag.  1. Wash your hands with soap and water.  2. Separate (detach) the bag from your leg.  3. Hold the bag over the toilet or a clean pail. Keep the bag lower than your hips and bladder. This is so the pee (urine) does not go back into the tube.  4. Open the pour spout. It is at the bottom of the bag.  5. Empty the pee into the toilet or pail. Do not let the pour spout touch any surface.  6. Put rubbing alcohol on a gauze pad or cotton ball.  7. Use the gauze pad or cotton ball to clean the pour spout.  8. Close the pour spout.  9. Attach the bag to your leg with tape or a leg strap.  10. Wash your hands with soap and water.  Follow instructions for cleaning the drainage bag:  · From the product maker.  · As told by your doctor.  How to change the bag  Supplies needed  · Alcohol wipes.  · A clean bag.  · Tape or a leg strap.  Changing the bag  Replace your bag when it starts to leak, smell bad, or look dirty.  1. Wash your hands with soap and water.  2. Separate the dirty bag from your leg.  3. Pinch the catheter with your fingers so that pee does not spill out.  4. Separate the catheter tube from the bag tube where these tubes connect (at the connection valve). Do not let the tubes touch any surface.  5. Clean the end of the catheter tube with an alcohol wipe. Use a different alcohol wipe to clean the end of the bag tube.  6. Connect the catheter tube to the tube of the clean bag.  7. Attach the clean bag to your leg with tape or a leg strap. Do not make the bag tight on your leg.  8. Wash your hands with soap and water.  General rules    · Never pull on your catheter. Never try to take it out. Doing that can hurt you.  · Always wash your hands before and after you touch your catheter or bag. Use a mild, fragrance-free soap. If you do not have soap and water, use hand .  · Always make sure there are no twists or bends (kinks) in the catheter tube.  · Always make sure there are no leaks in  the catheter or bag.  · Drink enough fluid to keep your pee pale yellow.  · Do not take baths, swim, or use a hot tub.  · If you are female, wipe from front to back after you poop (have a bowel movement).  Contact a doctor if:  · Your pee is cloudy.  · Your pee smells worse than usual.  · Your catheter gets clogged.  · Your catheter leaks.  · Your bladder feels full.  Get help right away if:  · You have redness, swelling, or pain where the catheter goes into your body.  · You have fluid, blood, pus, or a bad smell coming from the area where the catheter goes into your body.  · Your skin feels warm where the catheter goes into your body.  · You have a fever.  · You have pain in your:  ? Belly (abdomen).  ? Legs.  ? Lower back.  ? Bladder.  · You see blood in the catheter.  · Your pee is pink or red.  · You feel sick to your stomach (nauseous).  · You throw up (vomit).  · You have chills.  · Your pee is not draining into the bag.  · Your catheter gets pulled out.  Summary  · An indwelling urinary catheter is a thin tube that is placed into the bladder to help drain pee (urine) out of the body.  · The catheter is placed into the part of the body that drains pee from the bladder (urethra).  · Taking good care of your catheter will keep it working properly and help prevent problems.  · Always wash your hands before and after touching your catheter or bag.  · Never pull on your catheter or try to take it out.  This information is not intended to replace advice given to you by your health care provider. Make sure you discuss any questions you have with your health care provider.  Document Released: 04/14/2014 Document Revised: 04/10/2020 Document Reviewed: 08/03/2018  Elsevier Patient Education © 2020 [employer]

## 2021-03-06 NOTE — CARE PLAN
Problem: Venous Thromboembolism (VTW)/Deep Vein Thrombosis (DVT) Prevention:  Goal: Patient will participate in Venous Thrombosis (VTE)/Deep Vein Thrombosis (DVT)Prevention Measures  Outcome: PROGRESSING AS EXPECTED   NOTE: SCDs on.    Problem: Respiratory:  Goal: Respiratory status will improve  Outcome: PROGRESSING AS EXPECTED   NOTE: 2L NC

## 2021-03-06 NOTE — CARE PLAN
Problem: Discharge Barriers/Planning  Goal: Patient's continuum of care needs will be met  Outcome: PROGRESSING AS EXPECTED     Problem: Pain Management  Goal: Pain level will decrease to patient's comfort goal  Outcome: PROGRESSING AS EXPECTED   NOTE: Patient complains of 2/10 head pain intermittently.

## 2021-03-07 NOTE — PROGRESS NOTES
Orders for patient to discharge home. Went over discharge instructions with patient. Discussed follow up appointments and medication changes. Prescriptions sent to [facility] in [place], [place]. Copy of discharge instructions given to patient and daughter. Patient and family had no further questions. Patient and family verified all belongings are leaving with the patient. Pt discharged by wheelchair at 1530. Extensive education provided to patient and daughter regarding yuan. All questions answered.

## 2021-03-25 ENCOUNTER — APPOINTMENT (OUTPATIENT)
Dept: ADMISSIONS | Facility: MEDICAL CENTER | Age: 71
End: 2021-03-25
Payer: MEDICARE

## 2021-03-31 ENCOUNTER — APPOINTMENT (OUTPATIENT)
Dept: RADIOLOGY | Facility: MEDICAL CENTER | Age: 71
End: 2021-03-31
Attending: RADIOLOGY
Payer: MEDICARE

## 2021-04-02 ASSESSMENT — ENCOUNTER SYMPTOMS
SORE THROAT: 0
WEIGHT LOSS: 0
CONSTITUTIONAL NEGATIVE: 1
WEAKNESS: 0
SENSORY CHANGE: 0
FEVER: 0
RESPIRATORY NEGATIVE: 1
EYE REDNESS: 1
SPEECH CHANGE: 0
CHILLS: 0
DIAPHORESIS: 0
FOCAL WEAKNESS: 0
CARDIOVASCULAR NEGATIVE: 1
BACK PAIN: 1

## 2021-04-02 ASSESSMENT — LIFESTYLE VARIABLES: SUBSTANCE_ABUSE: 0

## 2021-04-02 NOTE — CONSULTS
"Neuro Interventional Service Consultation      Re: Darnell Kirkland     MRN: [id number]   : 1950    Darnell Kirkland was referred to our service by Lencho Hutchins DO. She is a 70 y.o. female seen in clinic for evaluation and possible neurovascular intervention. She is also under the care of Scout Alexis MD.    History of Present Illness:   initially presented with severe left orbital pain since August after a left upper molar root canal procedure 2020. She also noted the inability to move her left eye properly. MRI from 2020 showed right frontal and left parietal FLAIR signal. She was treated with a steroid course and her left eye symptoms improved. After the steroid taper, she then developed bilateral eye pain, bilateral reddened conjunctiva, bilateral eye swelling, and a right ear bruit/ tinnitus that sounds like her heart beat in her ear. She also reports \"brain fog.\" Both parents had Parkinson's Disease and she was concerned that this was a similar symptom. 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 She was referred to the Neuro Interventional Service for evaluation and management of these findings. The plan was to repair the fistulas over 2 separate sessions. She underwent stage 1 repair of the right and partial repair of the left fistulas on March 3. On , she was noted to have worsened left eye symptoms that progressed to include a 6th nerve palsy. The decision was made to embolize the remaining fistula, which was done on . Ms. Kirkland tolerated her procedures well and had improvement in her eye symptoms prior to hospital discharge.      She is seen today for review of imaging studies and discussion of follow up for the fistula and aneurysm. Today, the patient reports improved symptoms in both eyes. She reports resolution of the headaches and " blurred vision in both eyes. She has bilateral redness but states she is under treatment for dry eyes and a recent exam showed her eye pressures were much improved. She is down to a single eye drop medication. The right tinnitus is also resolved. She is no longer using a walker. There is a history of smoking, quit in 2011. She is unaccompanied by her daughter Estelle to the appointment today. Ms. Kirkland has been able to resume her usual activities and has regained her independence. She continues to follow up with her other physicians.      Past Medical History:   Diagnosis Date   • Arthritis    • Asthma    • Bowel habit changes     constipation   • Diabetes (HCC)    • Diabetic neuropathy (HCC)    • Head ache    • Hyperlipidemia    • Hypertension    • Migraine    • Sleep apnea     CPAP   • Snoring      Past Surgical History:   Procedure Laterality Date   • CARPAL TUNNEL RELEASE     • MENISCUS REPAIR     • OTHER      back surgery     Social History     Socioeconomic History   • Marital status:      Spouse name: Not on file   • Number of children: Not on file   • Years of education: Not on file   • Highest education level: Not on file   Occupational History   • Not on file   Tobacco Use   • Smoking status: Former Smoker     Quit date: 1/20/2011     Years since quitting: 10.2   • Smokeless tobacco: Never Used   Substance and Sexual Activity   • Alcohol use: Yes     Alcohol/week: 0.6 oz     Types: 1 Glasses of wine per week   • Drug use: Not Currently     Comment: previously took marijuana for headaches   • Sexual activity: Not on file   Other Topics Concern   • Not on file   Social History Narrative   • Not on file     Social Determinants of Health     Financial Resource Strain:    • Difficulty of Paying Living Expenses:    Food Insecurity:    • Worried About Running Out of Food in the Last Year:    • Ran Out of Food in the Last Year:    Transportation Needs:    • Lack of Transportation (Medical):    • Lack of  Transportation (Non-Medical):    Physical Activity:    • Days of Exercise per Week:    • Minutes of Exercise per Session:    Stress:    • Feeling of Stress :    Social Connections:    • Frequency of Communication with Friends and Family:    • Frequency of Social Gatherings with Friends and Family:    • Attends Islam Services:    • Active Member of Clubs or Organizations:    • Attends Club or Organization Meetings:    • Marital Status:    Intimate Partner Violence:    • Fear of Current or Ex-Partner:    • Emotionally Abused:    • Physically Abused:    • Sexually Abused:      Family History   Problem Relation Age of Onset   • Diabetes Mother    • Hypertension Mother    • Parkinson's Disease Mother    • Diabetes Father    • Hypertension Father    • Parkinson's Disease Father        Review of Systems   Constitutional: Negative.  Negative for chills, diaphoresis, fever, malaise/fatigue and weight loss.   HENT: Negative for congestion and sore throat.    Eyes: Positive for redness. Negative for blurred vision, double vision and pain.   Respiratory: Negative.    Cardiovascular: Negative.    Musculoskeletal: Positive for back pain and joint pain.   Skin: Negative.    Neurological: Negative for sensory change, speech change, focal weakness and weakness.   Psychiatric/Behavioral: Negative for substance abuse.     A comprehensive 14-point review of systems was negative except as described above.     Labs:      Ref. Range 3/5/2021 02:37   WBC Latest Ref Range: 4.8 - 10.8 K/uL 9.6   RBC Latest Ref Range: 4.20 - 5.40 M/uL 4.16 (L)   Hemoglobin Latest Ref Range: 12.0 - 16.0 g/dL 13.1   Hematocrit Latest Ref Range: 37.0 - 47.0 % 39.3   MCV Latest Ref Range: 81.4 - 97.8 fL 94.5   MCH Latest Ref Range: 27.0 - 33.0 pg 31.5   MCHC Latest Ref Range: 33.6 - 35.0 g/dL 33.3 (L)   RDW Latest Ref Range: 35.9 - 50.0 fL 46.5   Platelet Count Latest Ref Range: 164 - 446 K/uL 215   MPV Latest Ref Range: 9.0 - 12.9 fL 10.9   Neutrophils-Polys  Latest Ref Range: 44.00 - 72.00 % 85.20 (H)   Neutrophils (Absolute) Latest Ref Range: 2.00 - 7.15 K/uL 8.15 (H)   Lymphocytes Latest Ref Range: 22.00 - 41.00 % 10.60 (L)   Lymphs (Absolute) Latest Ref Range: 1.00 - 4.80 K/uL 1.01   Monocytes Latest Ref Range: 0.00 - 13.40 % 3.50   Monos (Absolute) Latest Ref Range: 0.00 - 0.85 K/uL 0.33   Eosinophils Latest Ref Range: 0.00 - 6.90 % 0.00   Eos (Absolute) Latest Ref Range: 0.00 - 0.51 K/uL 0.00   Basophils Latest Ref Range: 0.00 - 1.80 % 0.10   Baso (Absolute) Latest Ref Range: 0.00 - 0.12 K/uL 0.01   Immature Granulocytes Latest Ref Range: 0.00 - 0.90 % 0.60   Immature Granulocytes (abs) Latest Ref Range: 0.00 - 0.11 K/uL 0.06   Nucleated RBC Latest Units: /100 WBC 0.00      Ref. Range 3/6/2021 03:34   Sodium Latest Ref Range: 135 - 145 mmol/L 139   Potassium Latest Ref Range: 3.6 - 5.5 mmol/L 3.7   Chloride Latest Ref Range: 96 - 112 mmol/L 112   Co2 Latest Ref Range: 20 - 33 mmol/L 19 (L)   Anion Gap Latest Ref Range: 7.0 - 16.0  8.0   Glucose Latest Ref Range: 65 - 99 mg/dL 114 (H)   Bun Latest Ref Range: 8 - 22 mg/dL 18   Creatinine Latest Ref Range: 0.50 - 1.40 mg/dL 0.66   GFR If  Latest Ref Range: >60 mL/min/1.73 m 2 >60   GFR If Non  Latest Ref Range: >60 mL/min/1.73 m 2 >60   Calcium Latest Ref Range: 8.5 - 10.5 mg/dL 8.9      Ref. Range 2/26/2021 09:11   PT Latest Ref Range: 12.0 - 14.6 sec 12.8   INR Latest Ref Range: 0.87 - 1.13  0.93     Radiology:   Neurointerventional radiology procedure 3/5/21 at [facility]:  Successful endovascular repair of residual left cavernous sinus fistula.    MRI orbits 3/4/21 at [facility]:  1.  There are changes secondary to the endovascular repair of carotid cavernous fistula. There are abnormal left cavernous sinus flow voids consistent with residual left-sided CC fistula. Please note the left-sided carotid-cavernous fistula was not   repaired completely.  2.  There is edematous bilateral  extraocular muscles. This is unchanged since the previous MRI dated 1/26/2021.  3.  Mild cerebral volume loss.  4.  Mild chronic microvascular ischemic disease.    Neurointerventional radiology procedure 3/3/21 at [facility]:  1.  Bilateral carotid cavernous fistula.  2.  Successful repair of right cavernous sinus fistula through the venous and arterial approach.  3.  Partial repair  of left cavernous sinus fistula through the arterial approach.  4.  Incidental an approximately 3 to 4 mm sized right supraclinoid internal carotid aneurysm.    Catheter angiogram on 1/27/21 at [facility]:  1.  Abnormal early enhancement of the bilateral cavernous sinuses and superior ophthalmic veins suggestive of low flow cavernous fistula predominantly supplied by bilateral branches of external carotid and internal carotid arteries. There is no direct communication of the internal carotid artery and the cavernous sinus to suggest high flow vascular malformation.  2.  An approximately 3 to 4 mm sized incidental right posterior communicating artery aneurysm.          MRV head 1/26/21 at [facility]:  Cerebral magnetic resonance venogram within normal limits with no evidence of dural venous sinus thrombosis.    MRA neck 1/26/21 at [facility]:  MRA of the neck within normal limits with no evidence of flow-limiting lesion.    MRA head 1/26/21 at [facility]:  MRA of the [place] within normal limits with no evidence of aneurysm or cerebrovascular occlusive disease.    MRI brain 1/26/21 at [facility]:  1.  Mild chronic microvascular ischemic type changes.  2.  Mild age-related cerebral atrophy.  3.  No acute intracranial abnormality or pathologic enhancement.    MRA head 11/10/20 at [facility]:  MRA OF THE [place] WITHIN NORMAL LIMITS.    MRA neck 11/10/20 at [facility]:  No abnormality in carotid and vertebral vessels on gadolinium enhanced MR angiogram.    MRI orbits 10/1/20 at [facility]:  MRI of the orbits without and with contrast within normal  limits.    MRI brain 10/1/2020 at [facility]:  1.  No acute hemorrhage, ischemia or mass  2.  Focal areas of abnormal cortical FLAIR signal in the RIGHT frontal and LEFT parietal lobes, likely small areas of encephalomalacia  3.  Moderate atrophy  4.  Mild white matter changes  5.  Please the separately dictated MRI of the orbits    Current Outpatient Medications   Medication Sig Dispense Refill   • acetaminophen (TYLENOL) 325 MG Tab Take 2 Tablets by mouth every 6 hours as needed (Mild Pain; (Pain scale 1-3); Temp greater than 100.5 F).  0   • tamsulosin (FLOMAX) 0.4 MG capsule Take 1 capsule by mouth every day. 30 capsule 0   • acetaZOLAMIDE (DIAMOX) 250 MG Tab Take 250 mg by mouth 2 times a day.     • dorzolamide-timolol (COSOPT) 22.3-6.8 MG/ML Solution Administer 1 Drop into both eyes 2 times a day.     • BEPREVE 1.5 % Solution Administer 1 Drop into the left eye as needed.     • Misc Natural Products (GLUCOSAMINE CHOND CMP ADVANCED PO) Take 1 tablet by mouth every day.     • B Complex Vitamins (VITAMIN B COMPLEX PO) Take 1 tablet by mouth every day.     • Cinnamon 500 MG Cap Take  by mouth.     • naproxen (NAPROSYN) 250 MG Tab Take 250 mg by mouth 1 time a day as needed.     • diphenhydrAMINE (BENADRYL) 25 MG Tab Take 25 mg by mouth at bedtime as needed for Sleep.     • latanoprost (XALATAN) 0.005 % Solution Administer 1 Drop into both eyes every bedtime. 2.5 mL 1   • Insulin Glargine, 2 Unit Dial, (TOUJEO MAX SOLOSTAR) 300 UNIT/ML Solution Pen-injector Inject 38 Units under the skin every bedtime.     • insulin aspart (NOVOLOG) 100 UNIT/ML Solution Inject 2-16 Units under the skin 3 times a day before meals. If BS  = 2 units  High 200=16 units     • metformin (GLUCOPHAGE) 1000 MG tablet Take 1,000 mg by mouth 2 times a day, with meals.     • furosemide (LASIX) 40 MG Tab Take 20 mg by mouth every day.     • atorvastatin (LIPITOR) 10 MG Tab Take 10 mg by mouth every evening.     • losartan (COZAAR) 100 MG Tab  Take 100 mg by mouth every evening.     • montelukast (SINGULAIR) 10 MG Tab Take 10 mg by mouth every bedtime.     • cyanocobalamin (VITAMIN B12) 1000 MCG Tab Take 1,000 mcg by mouth every day.     • thiamine (THIAMINE) 100 MG tablet Take 100 mg by mouth every day.     • vitamin D (CHOLECALCIFEROL) 1000 Unit (25 mcg) Tab Take 1,000 Units by mouth every day.     • therapeutic multivitamin-minerals (THERAGRAN-M) Tab Take 1 Tab by mouth every day.     • aspirin EC (ECOTRIN) 81 MG Tablet Delayed Response Take 81 mg by mouth every day.       No current facility-administered medications for this encounter.       Allergies   Allergen Reactions   • Clindamycin      Hives       Physical Exam   Constitutional: She is oriented to person, place, and time and well-developed, well-nourished, and in no distress. No distress.   HENT:   Head: Normocephalic.   Eyes: Right conjunctiva is injected (swelling present). Left conjunctiva is injected (swelling present). No scleral icterus.   Pulmonary/Chest: Effort normal. No respiratory distress.   Abdominal: She exhibits no distension.   Neurological: She is alert and oriented to person, place, and time. She has normal sensation and normal strength. She is not agitated and not disoriented. She displays no weakness, no tremor, facial symmetry, normal stance and normal speech. No cranial nerve deficit. Gait normal. Coordination and gait normal.   Skin: Skin is warm and dry. No rash noted. She is not diaphoretic. No erythema. No pallor.   Psychiatric: Mood, memory, affect and judgment normal.     Impression:   1. Left caroticocavernous fistulae, status post embolization.  2. Right caroticocavernous fistulae, status post embolization.  3. 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  4. Hypertension, controlled.  5. Hyperlipidemia.   6. Asthma.   7. Diabetes mellitus.    Plan:   Rojelio Burnett MD has reviewed 's history and imaging studies, examined the patient,  and discussed treatment options.  has recovered well from her procedure and is pleased with her outcome. We will plan a angiogram in 6 months. We explained that there is a chance of recurrence of the CCFs and she should contact us right away if her symptoms return.     Regarding the intracranial aneurysm:  Dr. Burnett again reviewed the aneurysm and indicates is represents a small supraclinoid artery aneurysm rather than a PCOM aneurysm, which changes the 5 year untreated rupture risk. It appears to be extradural. Aneurysms of this size and in this location carry a cumulative 5 year rupture risk approaching 0%; overall procedure risk is approximately 1-3%. Our recommendation is for surveillance at this time, which we can accomplish with the follow up angiogram in September. We discussed signs of both extradural and intradural aneurysm rupture, most importantly a sentinel headache and stroke with instructions to call an ambulance for the onset of these symptoms.       DAVID So with Rojelio Burnett MD  Neuro Interventional Service   [address] ([id number])  [place] [address]  [phone]

## 2021-04-07 ENCOUNTER — HOSPITAL ENCOUNTER (OUTPATIENT)
Dept: RADIOLOGY | Facility: MEDICAL CENTER | Age: 71
End: 2021-04-07
Attending: NURSE PRACTITIONER
Payer: MEDICARE

## 2021-04-07 DIAGNOSIS — I67.1 CAROTID-CAVERNOUS FISTULA: ICD-10-CM

## 2021-04-07 ASSESSMENT — ENCOUNTER SYMPTOMS
EYE PAIN: 0
BLURRED VISION: 0
DOUBLE VISION: 0

## 2021-09-13 ENCOUNTER — PRE-ADMISSION TESTING (OUTPATIENT)
Dept: ADMISSIONS | Facility: MEDICAL CENTER | Age: 71
End: 2021-09-13
Attending: RADIOLOGY
Payer: MEDICARE

## 2021-09-27 ENCOUNTER — HOSPITAL ENCOUNTER (OUTPATIENT)
Facility: MEDICAL CENTER | Age: 71
End: 2021-09-27
Attending: RADIOLOGY | Admitting: RADIOLOGY
Payer: MEDICARE

## 2021-09-27 ENCOUNTER — APPOINTMENT (OUTPATIENT)
Dept: RADIOLOGY | Facility: MEDICAL CENTER | Age: 71
End: 2021-09-27
Attending: RADIOLOGY
Payer: MEDICARE

## 2021-09-27 VITALS
TEMPERATURE: 97 F | WEIGHT: 217.59 LBS | SYSTOLIC BLOOD PRESSURE: 131 MMHG | OXYGEN SATURATION: 94 % | HEART RATE: 63 BPM | BODY MASS INDEX: 32.98 KG/M2 | DIASTOLIC BLOOD PRESSURE: 76 MMHG | RESPIRATION RATE: 16 BRPM | HEIGHT: 68 IN

## 2021-09-27 DIAGNOSIS — I67.1 C-C FISTULA: ICD-10-CM

## 2021-09-27 DIAGNOSIS — I67.1 INTRACRANIAL ANEURYSM: ICD-10-CM

## 2021-09-27 LAB
CREAT SERPL-MCNC: 0.43 MG/DL (ref 0.5–1.4)
ERYTHROCYTE [DISTWIDTH] IN BLOOD BY AUTOMATED COUNT: 46.3 FL (ref 35.9–50)
HCT VFR BLD AUTO: 44.4 % (ref 37–47)
HGB BLD-MCNC: 14.6 G/DL (ref 12–16)
INR PPP: 0.98 (ref 0.87–1.13)
MCH RBC QN AUTO: 30.5 PG (ref 27–33)
MCHC RBC AUTO-ENTMCNC: 32.9 G/DL (ref 33.6–35)
MCV RBC AUTO: 92.9 FL (ref 81.4–97.8)
PLATELET # BLD AUTO: 216 K/UL (ref 164–446)
PMV BLD AUTO: 11.8 FL (ref 9–12.9)
PROTHROMBIN TIME: 12.7 SEC (ref 12–14.6)
RBC # BLD AUTO: 4.78 M/UL (ref 4.2–5.4)
WBC # BLD AUTO: 6.1 K/UL (ref 4.8–10.8)

## 2021-09-27 PROCEDURE — 99153 MOD SED SAME PHYS/QHP EA: CPT

## 2021-09-27 PROCEDURE — 700111 HCHG RX REV CODE 636 W/ 250 OVERRIDE (IP)

## 2021-09-27 PROCEDURE — 82565 ASSAY OF CREATININE: CPT

## 2021-09-27 PROCEDURE — 36227 PLACE CATH XTRNL CAROTID: CPT

## 2021-09-27 PROCEDURE — 700111 HCHG RX REV CODE 636 W/ 250 OVERRIDE (IP): Performed by: RADIOLOGY

## 2021-09-27 PROCEDURE — 700117 HCHG RX CONTRAST REV CODE 255: Performed by: RADIOLOGY

## 2021-09-27 PROCEDURE — 85610 PROTHROMBIN TIME: CPT

## 2021-09-27 PROCEDURE — 160002 HCHG RECOVERY MINUTES (STAT)

## 2021-09-27 PROCEDURE — 85027 COMPLETE CBC AUTOMATED: CPT

## 2021-09-27 RX ORDER — SODIUM CHLORIDE 9 MG/ML
1000 INJECTION, SOLUTION INTRAVENOUS CONTINUOUS
Status: DISCONTINUED | OUTPATIENT
Start: 2021-09-27 | End: 2021-09-27 | Stop reason: HOSPADM

## 2021-09-27 RX ORDER — POTASSIUM CHLORIDE 750 MG/1
10 TABLET, FILM COATED, EXTENDED RELEASE ORAL 2 TIMES DAILY
COMMUNITY
Start: 2021-08-10

## 2021-09-27 RX ORDER — ACETAMINOPHEN 500 MG
1000 TABLET ORAL EVERY 6 HOURS PRN
COMMUNITY

## 2021-09-27 RX ORDER — MIDAZOLAM HYDROCHLORIDE 1 MG/ML
.5-2 INJECTION INTRAMUSCULAR; INTRAVENOUS PRN
Status: ACTIVE | OUTPATIENT
Start: 2021-09-27 | End: 2021-09-27

## 2021-09-27 RX ORDER — MIDAZOLAM HYDROCHLORIDE 1 MG/ML
INJECTION INTRAMUSCULAR; INTRAVENOUS
Status: COMPLETED
Start: 2021-09-27 | End: 2021-09-27

## 2021-09-27 RX ORDER — HYDROMORPHONE HYDROCHLORIDE 1 MG/ML
0.25 INJECTION, SOLUTION INTRAMUSCULAR; INTRAVENOUS; SUBCUTANEOUS
Status: DISCONTINUED | OUTPATIENT
Start: 2021-09-27 | End: 2021-09-27 | Stop reason: HOSPADM

## 2021-09-27 RX ORDER — OXYCODONE HYDROCHLORIDE 5 MG/1
2.5 TABLET ORAL
Status: DISCONTINUED | OUTPATIENT
Start: 2021-09-27 | End: 2021-09-27 | Stop reason: HOSPADM

## 2021-09-27 RX ORDER — ONDANSETRON 2 MG/ML
4 INJECTION INTRAMUSCULAR; INTRAVENOUS PRN
Status: ACTIVE | OUTPATIENT
Start: 2021-09-27 | End: 2021-09-27

## 2021-09-27 RX ORDER — SODIUM CHLORIDE 9 MG/ML
500 INJECTION, SOLUTION INTRAVENOUS
Status: ACTIVE | OUTPATIENT
Start: 2021-09-27 | End: 2021-09-27

## 2021-09-27 RX ORDER — OXYCODONE HYDROCHLORIDE 5 MG/1
5 TABLET ORAL
Status: DISCONTINUED | OUTPATIENT
Start: 2021-09-27 | End: 2021-09-27 | Stop reason: HOSPADM

## 2021-09-27 RX ORDER — NAPROXEN 500 MG/1
1000 TABLET ORAL EVERY MORNING
COMMUNITY

## 2021-09-27 RX ORDER — HEPARIN SODIUM 1000 [USP'U]/ML
INJECTION, SOLUTION INTRAVENOUS; SUBCUTANEOUS
Status: COMPLETED
Start: 2021-09-27 | End: 2021-09-27

## 2021-09-27 RX ADMIN — IOHEXOL 100 ML: 300 INJECTION, SOLUTION INTRAVENOUS at 14:00

## 2021-09-27 RX ADMIN — FENTANYL CITRATE 25 MCG: 50 INJECTION, SOLUTION INTRAMUSCULAR; INTRAVENOUS at 12:25

## 2021-09-27 RX ADMIN — FENTANYL CITRATE 25 MCG: 50 INJECTION, SOLUTION INTRAMUSCULAR; INTRAVENOUS at 12:20

## 2021-09-27 RX ADMIN — MIDAZOLAM HYDROCHLORIDE 1 MG: 1 INJECTION, SOLUTION INTRAMUSCULAR; INTRAVENOUS at 12:20

## 2021-09-27 RX ADMIN — HEPARIN SODIUM: 1000 INJECTION, SOLUTION INTRAVENOUS; SUBCUTANEOUS at 12:20

## 2021-09-27 RX ADMIN — MIDAZOLAM HYDROCHLORIDE 1 MG: 1 INJECTION INTRAMUSCULAR; INTRAVENOUS at 12:20

## 2021-09-27 ASSESSMENT — FIBROSIS 4 INDEX: FIB4 SCORE: 2.17

## 2021-09-27 NOTE — OR NURSING
1309 Pt arrived via gurney from procedure room. Report received from RN. Monitors attached. Pt awake, denies pain and nausea. Right groin site soft with dressing CDI with gauze and tegaderm. RLE pulse verified.     1330 - Right femoral site checked. Dressing dry and intact. Pedal pulse and distal sensation present    10868 Site unchanged. Patient in reverse trendelenburg for comfort    1400- Pedal pulse and distal CMST intact right leg. Site dry and intact    1415 - Resting comfortably. Site unchanged. Distal pulse palpable. Sensation intact    1430 - site dry and intact. Distal CMST unchanged    1500 resting comfortably. Site check. Clean dry, intact, distal CMST intact.    1530 - dressed. Discharged instructions provided to and discussed with daughter who expresses understanding    1544 Discharged to care of daughter via wheelchair

## 2021-09-27 NOTE — PROGRESS NOTES
IR RN note    Patient underwent a cerebral angiogram with possible CCF interventions by Dr. Burnett.  Procedure site was verified by MD using imaging guidance. Consent was signed. Stephanie and Bianca, RNS and  IR yennifer Vicente  assisted. Patient was placed in a suppine position.  Vitals were taken every 5 minutes and remained stable during procedure (see doc flow sheet for results).  CO2 waveform capnography was monitored throughout procedure, see chart.  Right groin access site.   An angioSeal, gauze and tegaderm dressing was placed over surgical site. Report called to Prabhjot CHAMPION. Pt transported by lore with RN to [facility] post op , with monitor .   Reviewed sedation orders with MD Liao 6F   REF # [id number]  LOT # [id number]  Exp. 05-

## 2021-09-27 NOTE — OR NURSING
Two COVID test's ran and both came back as invalid. Dr. Burnett ok to proceed with procedure without covid test.

## 2021-09-27 NOTE — H&P
History and Physical    Date: 9/27/2021    PCP: Scout Alexis M.D.        HPI: This is a 71 y.o. female who is s/p CCF repair    Past Medical History:   Diagnosis Date   • Arthritis    • Asthma    • Bowel habit changes     constipation   • Diabetes (HCC)    • Diabetic neuropathy (HCC)    • Head ache    • Hyperlipidemia    • Hypertension    • Migraine    • Sleep apnea     CPAP   • Snoring        Past Surgical History:   Procedure Laterality Date   • CARPAL TUNNEL RELEASE     • MENISCUS REPAIR     • OTHER      back surgery       Current Facility-Administered Medications   Medication Dose Route Frequency Provider Last Rate Last Admin   • NS infusion 1,000 mL  1,000 mL Intravenous Continuous Rojelio Burnett M.D.            Social History     Socioeconomic History   • Marital status:      Spouse name: Not on file   • Number of children: Not on file   • Years of education: Not on file   • Highest education level: Not on file   Occupational History   • Not on file   Tobacco Use   • Smoking status: Former Smoker     Types: Cigarettes     Quit date: 1/20/2011     Years since quitting: 10.6   • Smokeless tobacco: Never Used   Vaping Use   • Vaping Use: Never used   Substance and Sexual Activity   • Alcohol use: Yes     Alcohol/week: 0.6 oz     Types: 1 Glasses of wine per week   • Drug use: Not Currently     Comment: previously took marijuana for headaches   • Sexual activity: Not on file   Other Topics Concern   • Not on file   Social History Narrative   • Not on file     Social Determinants of Health     Financial Resource Strain:    • Difficulty of Paying Living Expenses:    Food Insecurity:    • Worried About Running Out of Food in the Last Year:    • Ran Out of Food in the Last Year:    Transportation Needs:    • Lack of Transportation (Medical):    • Lack of Transportation (Non-Medical):    Physical Activity:    • Days of Exercise per Week:    • Minutes of Exercise per Session:    Stress:    • Feeling of  Stress :    Social Connections:    • Frequency of Communication with Friends and Family:    • Frequency of Social Gatherings with Friends and Family:    • Attends Hindu Services:    • Active Member of Clubs or Organizations:    • Attends Club or Organization Meetings:    • Marital Status:    Intimate Partner Violence:    • Fear of Current or Ex-Partner:    • Emotionally Abused:    • Physically Abused:    • Sexually Abused:        Family History   Problem Relation Age of Onset   • Diabetes Mother    • Hypertension Mother    • Parkinson's Disease Mother    • Diabetes Father    • Hypertension Father    • Parkinson's Disease Father        Allergies:  Clindamycin    Review of Systems:  CCF    Physical Exam:    alert and oriented   Clear speach      Vital Signs  Blood Pressure : 122/69   Temperature: 36.4 °C (97.5 °F)   Pulse: 71   Respiration: 18   Pulse Oximetry: 94 %        Labs:  Recent Labs     09/27/21  1035   WBC 6.1   RBC 4.78   HEMOGLOBIN 14.6   HEMATOCRIT 44.4   MCV 92.9   MCH 30.5   MCHC 32.9*   RDW 46.3   PLATELETCT 216   MPV 11.8     Recent Labs     09/27/21  1035   CREATININE 0.43*               Radiology:  No orders to display             Assessment and Plan:This is a 71 y.o who is s/p CCF repair.    Plan:Diagnostic cerebral angiogram and possible repair of CCF.

## 2021-09-27 NOTE — OR SURGEON
Immediate Post- Operative Note        Findings:[facility]      Procedure(s): Diagnostic cerebral angiogram      Estimated Blood Loss: Less than 5 ml        Complications: None            9/27/2021     1:06 PM     Rojelio Burnett M.D.

## 2021-09-27 NOTE — DISCHARGE INSTRUCTIONS
"Post Angiogram Groin Care Instructions     INSTRUCTIONS  1. Examine (look and feel) the site of your incision site TODAY so you can recognize changes that should be called to your doctor (see below).  2. Avoid straining either by lifting or pulling objects for 4-5 days. Avoid lifting over 5 pounds.   3. For at least 72 hours, if you should sneeze or cough, please hold pressure over your groin area.  4. If you should begin to have oozing from the catheterization site, please hold firm pressure and call your doctor's office immediately.  5. If profuse bleeding occurs from the catheterization site, hold firm pressure and call \"[phone]\" immediately for assistance.  6. Remove bandage after 24 hours.     ACTIVITY  1. Limit activity as instructed by your doctor.  2. No driving or very limited driving with frequent stops for one week.   3. If you must take a long car ride, stop every hour and walk around the car.   4. Warm showers or baths are permitted after the bandage is removed. Avoid hot showers, baths, hot tubs, and swimming for one week.    PLEASE CALL YOUR DOCTOR IF:  1. Temperature elevation occurs.  2. Catheterization site becomes reddened or begins to drain.   3. Bruising appears to be new or not resolving. The bruise may move down your leg. This is normal.  4. The small round lump in the groin increases in size.  5. Any leg numbness, aching, or discomfort (immediately).  6. Increasing discomfort in the leg at the insertion site.  7. Chest pains, even if relieved by Nitroglycerin.    MISCELLANEOUS INSTRUCTIONS  1. Bruising may occur as a result of heart catheterization. Some of the discoloration may travel down the leg, going from blue to green in color.  2. A small round lump under the catheterization site will remain for up to six weeks.  3. If any questions arise call your physician's office. The Contact Center is open Monday through Friday 7AM to 5PM and may speak to a nurse at [phone], or toll free at " [phone].   4. You should call 911 if you develop problems with breathing or chest pain.    FOR PROBLEMS CALL DR: Rojelio Burnett at [phone]    I acknowledge receipt and understanding of these Home Care instructions.    ACTIVITY: Rest and take it easy for the first 24 hours.  A responsible adult is recommended to remain with you during that time.  It is normal to feel sleepy.  We encourage you to not do anything that requires balance, judgment or coordination.    MILD FLU-LIKE SYMPTOMS ARE NORMAL. YOU MAY EXPERIENCE GENERALIZED MUSCLE ACHES, THROAT IRRITATION, HEADACHE AND/OR SOME NAUSEA.    FOR 24 HOURS DO NOT:  Drive, operate machinery or run household appliances.  Drink beer or alcoholic beverages.   Make important decisions or sign legal documents.    SPECIAL INSTRUCTIONS:     DIET: To avoid nausea, slowly advance diet as tolerated, avoiding spicy or greasy foods for the first day.  Add more substantial food to your diet according to your physician's instructions.  Babies can be fed formula or breast milk as soon as they are hungry.  INCREASE FLUIDS AND FIBER TO AVOID CONSTIPATION.    SURGICAL DRESSING/BATHING: Warm showers or baths are permitted after the bandage is removed. Avoid hot showers, baths, hot tubs, and swimming for one week.    FOLLOW-UP APPOINTMENT:  A follow-up appointment should be arranged with your doctor, call to schedule.    You should CALL YOUR PHYSICIAN if you develop:  Fever greater than 101 degrees F.  Pain not relieved by medication, or persistent nausea or vomiting.  Excessive bleeding (blood soaking through dressing) or unexpected drainage from the wound.  Extreme redness or swelling around the incision site, drainage of pus or foul smelling drainage.  Inability to urinate or empty your bladder within 8 hours.  Problems with breathing or chest pain.    You should call 911 if you develop problems with breathing or chest pain.  If you are unable to contact your doctor or  surgical center, you should go to the nearest emergency room or urgent care center.  Physician's telephone #: [phone]    If any questions arise, call your doctor.  If your doctor is not available, please feel free to call the Surgical Center at [phone]. The Contact Center is open Monday through Friday 7AM to 5PM and may speak to a nurse at [phone], or toll free at [phone].     A registered nurse may call you a few days after your surgery to see how you are doing after your procedure.    MEDICATIONS: Resume taking daily medication.  Take prescribed pain medication with food.  If no medication is prescribed, you may take non-aspirin pain medication if needed.  PAIN MEDICATION CAN BE VERY CONSTIPATING.  Take a stool softener or laxative such as senokot, pericolace, or milk of magnesia if needed.    Prescription given for________.  Last pain medication given at ________.    If your physician has prescribed pain medication that includes Acetaminophen (Tylenol), do not take additional Acetaminophen (Tylenol) while taking the prescribed medication.    Depression / Suicide Risk    As you are discharged from this [facility], it is important to learn how to keep safe from harming yourself.    Recognize the warning signs:  · Abrupt changes in personality, positive or negative- including increase in energy   · Giving away possessions  · Change in eating patterns- significant weight changes-  positive or negative  · Change in sleeping patterns- unable to sleep or sleeping all the time   · Unwillingness or inability to communicate  · Depression  · Unusual sadness, discouragement and loneliness  · Talk of wanting to die  · Neglect of personal appearance   · Rebelliousness- reckless behavior  · Withdrawal from people/activities they love  · Confusion- inability to concentrate     If you or a loved one observes any of these behaviors or has concerns about self-harm, here's what you can do:  · Talk  about it- your feelings and reasons for harming yourself  · Remove any means that you might use to hurt yourself (examples: pills, rope, extension cords, firearm)  · Get professional help from the community (Mental Health, Substance Abuse, psychological counseling)  · Do not be alone:Call your Safe Contact- someone whom you trust who will be there for you.  · Call your local CRISIS HOTLINE 808-2851 or 426-237-0755  · Call your local Children's Mobile Crisis Response Team [place] (321) 304-2070 or wwwOVGuide  · Call the toll free National Suicide Prevention Hotlines   · National Suicide Prevention Lifeline 211-024-TQTH (1681)  · National Hope Line Network 800-SUICIDE (402-8388)

## 2023-10-26 ENCOUNTER — HOSPITAL ENCOUNTER (OUTPATIENT)
Dept: RADIOLOGY | Facility: MEDICAL CENTER | Age: 73
End: 2023-10-26
Payer: MEDICARE

## 2024-01-11 NOTE — CONSULTS
Critical Care Consultation    Date of consult: 3/3/2021    Referring Physician  Rojelio Burnett M.D.    Reason for Consultation  Critical care management status post IR procedure requiring every hour neuro checks    History of Presenting Illness  Ms. Kirkland is a 70-year-old female with PMH significant for HTN, DM 2, asthma, and HLD who presented 3/3/2021 for elective IR procedure.  She has been having left orbital pain since last summer and has been evaluated by ophthalmology and diagnosed with carotid cavernous fistula.  Four-vessel angiogram done January 2021 showed no flow cavernous fistula predominantly supplied by bilateral branches of the external carotid and internal carotid arteries.  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  She underwent elective vascular repair earlier this morning by Dr. Burnett.  She was transferred to ICU for every hour neuro checks.  Goal SBP <140.  No blood thinners until cleared by Dr. Burnett.     Code Status  Full Code    Review of Systems  Review of Systems   Constitutional: Negative for chills and fever.   HENT: Negative.    Eyes: Positive for photophobia, pain and redness.        Bilateral bloodshot eyes at her baseline   Respiratory: Negative for cough, shortness of breath and wheezing.    Cardiovascular: Negative for chest pain, palpitations and leg swelling.   Gastrointestinal: Negative for nausea and vomiting.   Genitourinary: Negative for dysuria and urgency.   Musculoskeletal: Negative for falls, joint pain and myalgias.   Neurological: Negative for dizziness, focal weakness and headaches.   Psychiatric/Behavioral: The patient is not nervous/anxious and does not have insomnia.    All other systems reviewed and are negative.      Past Medical History   has a past medical history of Arthritis, Asthma, Bowel habit changes, Diabetes (HCC), Diabetic neuropathy (HCC), Head ache, Hyperlipidemia, Hypertension, Migraine, Sleep apnea, and  Snoring.    Surgical History   has a past surgical history that includes other; meniscus repair; and carpal tunnel release.    Family History  family history includes Diabetes in her father and mother; Hypertension in her father and mother; Parkinson's Disease in her father and mother.    Social History   reports that she quit smoking about 10 years ago. She has never used smokeless tobacco. She reports current alcohol use of about 0.6 oz of alcohol per week. She reports previous drug use.    Medications  Home Medications     Reviewed by Wing Arita (Pharmacy Tech) on 03/03/21 at 0709  Med List Status: Complete   Medication Last Dose Status   acetaZOLAMIDE (DIAMOX) 250 MG Tab 3/3/2021 Active   aspirin EC (ECOTRIN) 81 MG Tablet Delayed Response 2/26/2021 Active   atorvastatin (LIPITOR) 10 MG Tab 3/2/2021 Active   B Complex Vitamins (VITAMIN B COMPLEX PO) 2/26/2021 Active   BEPREVE 1.5 % Solution 3/1/2021 Active   Cinnamon 500 MG Cap 2/26/2021 Active   cyanocobalamin (VITAMIN B12) 1000 MCG Tab 2/26/2021 Active   diphenhydrAMINE (BENADRYL) 25 MG Tab 2/25/2021 Active   dorzolamide-timolol (COSOPT) 22.3-6.8 MG/ML Solution 3/3/2021 Active   furosemide (LASIX) 40 MG Tab 3/1/2021 Active   insulin aspart (NOVOLOG) 100 UNIT/ML Solution 3/2/2021 Active   Insulin Glargine, 2 Unit Dial, (TOUJEO MAX SOLOSTAR) 300 UNIT/ML Solution Pen-injector 3/1/2021 Active   latanoprost (XALATAN) 0.005 % Solution 3/2/2021 Active   losartan (COZAAR) 100 MG Tab 3/1/2021 Active   metformin (GLUCOPHAGE) 1000 MG tablet 2/28/2021 Active   Misc Natural Products (GLUCOSAMINE CHOND CMP ADVANCED PO) 2/26/2021 Active   montelukast (SINGULAIR) 10 MG Tab 3/1/2021 Active   naproxen (NAPROSYN) 250 MG Tab 2/26/2021 Active   therapeutic multivitamin-minerals (THERAGRAN-M) Tab 2/26/2021 Active   thiamine (THIAMINE) 100 MG tablet 2/26/2021 Active   vitamin D (CHOLECALCIFEROL) 1000 Unit (25 mcg) Tab 2/26/2021 Active              Current  Facility-Administered Medications   Medication Dose Route Frequency Provider Last Rate Last Admin   • lidocaine (XYLOCAINE) 1 % injection 0.5 mL  0.5 mL Intradermal Once PRN Rojelio Burnett M.D.       • labetalol (NORMODYNE/TRANDATE) injection 10 mg  10 mg Intravenous Once Mike Michelle M.D.   Stopped at 03/03/21 1300   • acetaZOLAMIDE (DIAMOX) tablet 250 mg  250 mg Oral BID Mike Michelle M.D.       • atorvastatin (LIPITOR) tablet 10 mg  10 mg Oral Q EVENING Mike Michelle M.D.       • dorzolamide-timolol (COSOPT) 22.3-6.8 MG/ML ophthalmic drops 1 Drop  1 Drop Both Eyes BID Mike Michelle M.D.       • [START ON 3/4/2021] furosemide (LASIX) tablet 20 mg  20 mg Oral DAILY Mike Michelle M.D.       • insulin glargine (Lantus) injection  30 Units Subcutaneous QHS Mike Michelle M.D.       • losartan (COZAAR) tablet 100 mg  100 mg Oral Q EVENING Mike Michelle M.D.       • montelukast (SINGULAIR) tablet 10 mg  10 mg Oral QHS Mike Michelle M.D.       • [START ON 3/4/2021] thiamine (Vitamin B-1) tablet 100 mg  100 mg Oral DAILY Mike Michelle M.D.       • [START ON 3/4/2021] cyanocobalamin (VITAMIN B-12) tablet 1,000 mcg  1,000 mcg Oral DAILY Mike Michelle M.D.       • senna-docusate (PERICOLACE or SENOKOT S) 8.6-50 MG per tablet 2 tablet  2 tablet Oral BID Mike Michelle M.D.        And   • polyethylene glycol/lytes (MIRALAX) PACKET 1 Packet  1 Packet Oral QDAY PRN Mike Michelle M.D.        And   • magnesium hydroxide (MILK OF MAGNESIA) suspension 30 mL  30 mL Oral QDAY PRN Mike Michelle M.D.        And   • bisacodyl (DULCOLAX) suppository 10 mg  10 mg Rectal QDAY PRN Mike Michelle M.D.       • labetalol (NORMODYNE/TRANDATE) injection 10-20 mg  10-20 mg Intravenous Q4HRS PRN Mike Michelle M.D.       • ondansetron (ZOFRAN) syringe/vial injection 4 mg  4 mg Intravenous Q4HRS PRN Mike Michelle M.D.       • ondansetron  (ZAIRA BERMANT) dispertab 4 mg  4 mg Oral Q4HRS PRN Mike Michelle M.D.       • acetaminophen (Tylenol) tablet 650 mg  650 mg Oral Q6HRS PRN Mike Michelle M.D.       • Pharmacy Consult Request ...Pain Management Review 1 Each  1 Each Other PHARMACY TO DOSE Mike Michelle M.D.       • oxyCODONE immediate-release (ROXICODONE) tablet 2.5 mg  2.5 mg Oral Q3HRS PRN Mike Michelle M.D.   2.5 mg at 03/03/21 1405    Or   • oxyCODONE immediate-release (ROXICODONE) tablet 5 mg  5 mg Oral Q3HRS PRN Mike Michelle M.D.        Or   • HYDROmorphone pf (DILAUDID) injection 0.25 mg  0.25 mg Intravenous Q3HRS PRN Mike Michelle M.D.       • insulin regular (HumuLIN R,NovoLIN R) injection  2-9 Units Subcutaneous 4X/DAY ACHS Mike Michelle M.D.        And   • glucose 4 g chewable tablet 16 g  16 g Oral Q15 MIN PRN Mike Michelle M.D.        And   • dextrose 50% (D50W) injection 50 mL  50 mL Intravenous Q15 MIN PRN Mike Michelle M.D.       • hydrALAZINE (APRESOLINE) injection 20 mg  20 mg Intravenous Q6HRS PRN Mike Michelle M.D.       • Netarsudil-Latanoprost 0.02-0.005 % SOLN 1 Drop  1 Drop Both Eyes QHS Colton Shoemaker Jr. D.O.           Allergies  Allergies   Allergen Reactions   • Clindamycin      Hives       Vital Signs last 24 hours  Temp:  [35.9 °C (96.6 °F)-36.7 °C (98 °F)] 36.5 °C (97.7 °F)  Pulse:  [60-82] 60  Resp:  [12-25] 12  BP: (124-151)/(66-84) 135/66  SpO2:  [95 %-100 %] 95 %    Physical Exam  Physical Exam  Vitals and nursing note reviewed. Exam conducted with a chaperone present.   Constitutional:       General: She is sleeping. She is not in acute distress.     Appearance: She is obese. She is not ill-appearing.   HENT:      Head: Normocephalic.      Right Ear: Hearing normal.      Left Ear: Hearing normal.      Nose: Nose normal.      Mouth/Throat:      Lips: Pink.      Mouth: Mucous membranes are moist.      Pharynx: Oropharynx is clear.   Eyes:       Conjunctiva/sclera:      Right eye: Hemorrhage present.      Left eye: Hemorrhage present.      Comments: Bilateral periorbital swelling   Cardiovascular:      Rate and Rhythm: Normal rate.      Pulses: Normal pulses.      Heart sounds: Normal heart sounds.      Comments: Right femoral arterial access sites without hematoma  Pulmonary:      Effort: Pulmonary effort is normal.      Breath sounds: Normal breath sounds. No wheezing.   Abdominal:      General: Bowel sounds are normal.      Palpations: Abdomen is soft.      Tenderness: There is no abdominal tenderness.   Musculoskeletal:      Right lower leg: No edema.      Left lower leg: No edema.      Comments: Moves all extremities equally 5/5   Skin:     General: Skin is warm and dry.      Capillary Refill: Capillary refill takes less than 2 seconds.   Neurological:      General: No focal deficit present.      Mental Status: She is oriented to person, place, and time and easily aroused.      Sensory: Sensation is intact.      Motor: Motor function is intact.      Coordination: Coordination is intact.   Psychiatric:         Attention and Perception: Attention normal.         Mood and Affect: Mood normal.         Behavior: Behavior is cooperative.         Cognition and Memory: Cognition normal.         Judgment: Judgment normal.         Fluids    Intake/Output Summary (Last 24 hours) at 3/3/2021 1643  Last data filed at 3/3/2021 1600  Gross per 24 hour   Intake 1060 ml   Output 1470 ml   Net -410 ml       Laboratory  Recent Results (from the past 48 hour(s))   ACCU-CHEK GLUCOSE    Collection Time: 03/03/21  7:00 AM   Result Value Ref Range    Glucose - Accu-Ck 197 (H) 65 - 99 mg/dL   POC ACT (resulted by nursing)    Collection Time: 03/03/21 10:00 AM   Result Value Ref Range    Istat Activated Clotting Time 186 (H) 74 - 137 sec   POC ACT (resulted by nursing)    Collection Time: 03/03/21 10:57 AM   Result Value Ref Range    Istat Activated Clotting Time 208 (H) 74 -  137 sec   ACCU-CHEK GLUCOSE    Collection Time: 03/03/21 12:53 PM   Result Value Ref Range    Glucose - Accu-Ck 146 (H) 65 - 99 mg/dL       Imaging  IR-EMBOLIZE-NEURO-EXTRACRANIAL    (Results Pending)       Assessment/Plan  Carotid-cavernous fistula- (present on admission)  Assessment & Plan  -Status post endovascular repair today  -We will keep in ICU for close monitoring with serial neurologic exams every hour  -Keep SBP <140 with as needed IV antihypertensives    Type 2 diabetes mellitus (HCC)- (present on admission)  Assessment & Plan  -Holding home metformin while hospitalized  -Accu-Cheks AC at bedtime with SSI  -Continue home dose of Lantus  -Diabetic diet    Hyperlipidemia- (present on admission)  Assessment & Plan  -Continue statin    Hypertension- (present on admission)  Assessment & Plan  -Goal SBP <140  -Resume her home furosemide and acetazolamide  -As needed hydralazine and labetalol with parameters    Asthma- (present on admission)  Assessment & Plan  -History of  -Without acute exacerbation  -Continue Singulair      Discussed patient condition and risk of morbidity and/or mortality with Family, Pharmacy, , Charge nurse / hot rounds and Patient.    The patient remains critically ill.  Critical care time = 42 minutes in directly providing and coordinating critical care and extensive data review.  No time overlap and excludes procedures.  EBONY Childress.         113

## (undated) DEVICE — TOWEL STOP TIMEOUT SAFETY FLAG (40EA/CA)

## (undated) DEVICE — CHLORAPREP 26 ML APPLICATOR - ORANGE TINT(25/CA)

## (undated) DEVICE — SET LEADWIRE 5 LEAD BEDSIDE DISPOSABLE ECG (1SET OF 5/EA)

## (undated) DEVICE — SUTURE GENERAL

## (undated) DEVICE — NEPTUNE 4 PORT MANIFOLD - (20/PK)